# Patient Record
Sex: FEMALE | Race: WHITE | HISPANIC OR LATINO | Employment: OTHER | URBAN - NONMETROPOLITAN AREA
[De-identification: names, ages, dates, MRNs, and addresses within clinical notes are randomized per-mention and may not be internally consistent; named-entity substitution may affect disease eponyms.]

---

## 2023-03-28 DIAGNOSIS — E78.5 HYPERLIPIDEMIA, UNSPECIFIED HYPERLIPIDEMIA TYPE: Primary | ICD-10-CM

## 2023-03-28 RX ORDER — ROSUVASTATIN CALCIUM 10 MG/1
10 TABLET, COATED ORAL NIGHTLY
Qty: 90 TABLET | Refills: 1 | Status: SHIPPED | OUTPATIENT
Start: 2023-03-28 | End: 2023-05-02 | Stop reason: SDUPTHER

## 2023-03-28 RX ORDER — ROSUVASTATIN CALCIUM 10 MG/1
10 TABLET, COATED ORAL NIGHTLY
COMMUNITY
End: 2023-03-28 | Stop reason: SDUPTHER

## 2023-05-01 PROBLEM — K58.9 IRRITABLE BOWEL SYNDROME: Status: ACTIVE | Noted: 2023-05-01

## 2023-05-01 PROBLEM — Z79.890 NEED FOR PROPHYLACTIC HORMONE REPLACEMENT THERAPY (POSTMENOPAUSAL): Status: ACTIVE | Noted: 2023-05-01

## 2023-05-01 PROBLEM — M54.50 CHRONIC LOW BACK PAIN: Status: ACTIVE | Noted: 2023-05-01

## 2023-05-01 PROBLEM — E78.5 HYPERLIPIDEMIA: Status: ACTIVE | Noted: 2023-05-01

## 2023-05-01 PROBLEM — E66.9 OBESITY: Status: RESOLVED | Noted: 2023-05-01 | Resolved: 2023-05-01

## 2023-05-01 PROBLEM — I10 BENIGN ESSENTIAL HYPERTENSION: Status: ACTIVE | Noted: 2023-05-01

## 2023-05-01 PROBLEM — G89.29 CHRONIC LOW BACK PAIN: Status: ACTIVE | Noted: 2023-05-01

## 2023-05-01 PROBLEM — M47.9 DEGENERATIVE ARTHRITIS OF SPINE: Status: ACTIVE | Noted: 2023-05-01

## 2023-05-01 PROBLEM — K21.9 GERD (GASTROESOPHAGEAL REFLUX DISEASE): Status: ACTIVE | Noted: 2023-05-01

## 2023-05-01 PROBLEM — Z78.0 POSTMENOPAUSAL ESTROGEN DEFICIENCY: Status: ACTIVE | Noted: 2023-05-01

## 2023-05-01 PROBLEM — R73.01 ELEVATED FASTING GLUCOSE: Status: ACTIVE | Noted: 2023-05-01

## 2023-05-01 PROBLEM — Z00.00 MEDICARE ANNUAL WELLNESS VISIT, SUBSEQUENT: Status: ACTIVE | Noted: 2023-05-01

## 2023-05-01 PROBLEM — G43.909 MIGRAINE: Status: ACTIVE | Noted: 2023-05-01

## 2023-05-01 RX ORDER — MECLIZINE HYDROCHLORIDE 25 MG/1
1 TABLET ORAL 3 TIMES DAILY PRN
COMMUNITY
Start: 2019-12-10

## 2023-05-01 RX ORDER — HYOSCYAMINE SULFATE 0.125 MG
0.12 TABLET ORAL EVERY 4 HOURS PRN
COMMUNITY
Start: 2020-10-08 | End: 2023-05-02 | Stop reason: SDUPTHER

## 2023-05-01 RX ORDER — TRAMADOL HYDROCHLORIDE 50 MG/1
50 TABLET ORAL 3 TIMES DAILY
COMMUNITY
End: 2023-08-02 | Stop reason: SDUPTHER

## 2023-05-01 RX ORDER — CARVEDILOL 12.5 MG/1
12.5 TABLET ORAL 2 TIMES DAILY
COMMUNITY
End: 2023-05-02 | Stop reason: SDUPTHER

## 2023-05-01 RX ORDER — PSYLLIUM HUSK 0.4 G
CAPSULE ORAL
COMMUNITY
Start: 2017-06-30

## 2023-05-01 RX ORDER — ONDANSETRON HYDROCHLORIDE 8 MG/1
8 TABLET, FILM COATED ORAL EVERY 8 HOURS PRN
COMMUNITY
Start: 2020-01-08 | End: 2023-05-02 | Stop reason: SDUPTHER

## 2023-05-01 RX ORDER — HYOSCYAMINE SULFATE 0.38 MG/1
0.38 TABLET, EXTENDED RELEASE ORAL EVERY 12 HOURS PRN
COMMUNITY
Start: 2023-02-01 | End: 2023-05-02 | Stop reason: SDUPTHER

## 2023-05-01 RX ORDER — SUMATRIPTAN SUCCINATE 100 MG/1
100 TABLET ORAL AS NEEDED
COMMUNITY

## 2023-05-01 RX ORDER — PANTOPRAZOLE SODIUM 40 MG/1
40 TABLET, DELAYED RELEASE ORAL DAILY
COMMUNITY
End: 2023-11-02 | Stop reason: SDUPTHER

## 2023-05-01 NOTE — PROGRESS NOTES
Subjective   Patient ID: Jenn Mistry is a 67 y.o. female who presents for Follow-up, Hypertension, Hyperlipidemia, Pain, and PRE DIABETES.    HPI     CHRONIC PAIN  Degenerative arthritis/chronic pain that has been historically well controlled on Tramadol 50 mg BID  Rates pain as 6-8 out of 10 without meds, brings down to a 0-1 with the medication.  Doing well, no side effects reported other than one episode of nausea/dizziness that occurred when taking meds without eating.  Has not occurred again, now eating with dosing.  Taking 2 tablets a day, about 12 hours apart.  On rare occasion will need 3 times per day.     CSA last completed 7/2022  UDS last completed 7/2022    HYPERTENSION  Medications are being taken and tolerated well. No side effects are reported.  Patient is taking blood pressure outside of office and reports good control.  Patient denies chest pain, shortness of breath with exertion, palpitations, lower extremity edema, or dizziness.     HYPERLIPIDEMIA  Medication(s) are being taken as directed and tolerated well. No side effects reported.  Patient denies any facial droop, sudden vision loss, weakness or numbness on one side of body.   Patient denies chest pain, shortness of breath with exertion, palpitations, or symptoms of claudication.       Review of Systems   Respiratory:  Negative for shortness of breath.    Cardiovascular:  Negative for chest pain, palpitations and leg swelling.   Musculoskeletal:  Positive for back pain.   Neurological:  Negative for dizziness.   All other systems reviewed and are negative.      Objective   /84 (BP Location: Left arm, Patient Position: Sitting, BP Cuff Size: Large adult)   Pulse 63   Temp 36.5 °C (97.7 °F)   Resp 14   Wt 109 kg (240 lb 3.2 oz)   SpO2 95%   BMI 38.19 kg/m²     Physical Exam  Vitals and nursing note reviewed.   Constitutional:       General: She is not in acute distress.     Appearance: Normal appearance. She is not  toxic-appearing.   HENT:      Head: Normocephalic and atraumatic.   Cardiovascular:      Rate and Rhythm: Normal rate and regular rhythm.      Heart sounds: No murmur heard.     No friction rub. No gallop.   Pulmonary:      Effort: Pulmonary effort is normal.      Breath sounds: Normal breath sounds. No wheezing, rhonchi or rales.   Musculoskeletal:      Right lower leg: No edema.      Left lower leg: No edema.   Skin:     General: Skin is warm and dry.   Neurological:      General: No focal deficit present.      Mental Status: She is alert and oriented to person, place, and time.   Psychiatric:         Mood and Affect: Mood normal.         Behavior: Behavior normal.         Assessment/Plan   Problem List Items Addressed This Visit          Circulatory    Benign essential hypertension - Primary     BP under good control, 126/84 in office today.  Continue Carvedilol 12.5 mg BID.         Relevant Medications    carvedilol (Coreg) 12.5 mg tablet       Digestive    Irritable bowel syndrome     Hyoscyamine refilled today at patient request.         Relevant Medications    hyoscyamine (Anaspaz, Levsin) 0.125 mg tablet       Other    Chronic low back pain     Stable, well controlled on Tramadol 50 mg BID, this was refilled today.    Controlled Substance Visit -- I have personally reviewed the [place]AR[place] report for this patient. There is copy of report in electronic medical record. I have considered the risks of abuse, dependence, addiction, and diversion. I believe that it is clinically appropriate for this patient to be prescribed this medication.     CSA last completed 7/2022  UDS last completed 7/2022         Hyperlipidemia     Cholesterol historically well controlled on Rosuvastatin 10 mg daily, this was refilled today.  11/2022 TC/HDL ratio 2.6 (TRIG 133).  Goal TC/HDL ratio 3.4 or less, LDL 99 or less,  or less, and TRIG 150 or less.   Patient's cholesterol panel is favorable.         Relevant Medications     rosuvastatin (Crestor) 10 mg tablet    Migraine    Relevant Medications    carvedilol (Coreg) 12.5 mg tablet    ondansetron (Zofran) 8 mg tablet     Other Visit Diagnoses       Medication management              Follow-up in 3 months for recheck chronic pain + CS visit.  Will be due for CSA and UDS at that time.  Call for sooner follow-up if needed.     Time Spent  Prep time on day of patient encounter: 5 minutes  Time spent directly with patient, family or caregiver: 25 minutes  Additional Time Spent on Patient Care Activities: 0 minutes  Documentation Time: 6 minutes  Other Time Spent: 0 minutes  Total: 36 minutes      Scribe Attestation  By signing my name below, ILorena Scribe   attest that this documentation has been prepared under the direction and in the presence of Pam Galaviz DO.

## 2023-05-02 ENCOUNTER — OFFICE VISIT (OUTPATIENT)
Dept: PRIMARY CARE | Facility: CLINIC | Age: 68
End: 2023-05-02
Payer: MEDICARE

## 2023-05-02 VITALS
RESPIRATION RATE: 14 BRPM | DIASTOLIC BLOOD PRESSURE: 84 MMHG | OXYGEN SATURATION: 95 % | HEART RATE: 63 BPM | SYSTOLIC BLOOD PRESSURE: 126 MMHG | BODY MASS INDEX: 38.19 KG/M2 | TEMPERATURE: 97.7 F | WEIGHT: 240.2 LBS

## 2023-05-02 DIAGNOSIS — K58.9 IRRITABLE BOWEL SYNDROME, UNSPECIFIED TYPE: ICD-10-CM

## 2023-05-02 DIAGNOSIS — M54.50 CHRONIC LOW BACK PAIN, UNSPECIFIED BACK PAIN LATERALITY, UNSPECIFIED WHETHER SCIATICA PRESENT: ICD-10-CM

## 2023-05-02 DIAGNOSIS — E78.5 HYPERLIPIDEMIA, UNSPECIFIED HYPERLIPIDEMIA TYPE: ICD-10-CM

## 2023-05-02 DIAGNOSIS — G43.809 OTHER MIGRAINE WITHOUT STATUS MIGRAINOSUS, NOT INTRACTABLE: ICD-10-CM

## 2023-05-02 DIAGNOSIS — Z79.899 MEDICATION MANAGEMENT: ICD-10-CM

## 2023-05-02 DIAGNOSIS — G89.29 CHRONIC LOW BACK PAIN, UNSPECIFIED BACK PAIN LATERALITY, UNSPECIFIED WHETHER SCIATICA PRESENT: ICD-10-CM

## 2023-05-02 DIAGNOSIS — I10 BENIGN ESSENTIAL HYPERTENSION: Primary | ICD-10-CM

## 2023-05-02 PROCEDURE — 1036F TOBACCO NON-USER: CPT | Performed by: FAMILY MEDICINE

## 2023-05-02 PROCEDURE — 3079F DIAST BP 80-89 MM HG: CPT | Performed by: FAMILY MEDICINE

## 2023-05-02 PROCEDURE — 1160F RVW MEDS BY RX/DR IN RCRD: CPT | Performed by: FAMILY MEDICINE

## 2023-05-02 PROCEDURE — 99213 OFFICE O/P EST LOW 20 MIN: CPT | Performed by: FAMILY MEDICINE

## 2023-05-02 PROCEDURE — 3074F SYST BP LT 130 MM HG: CPT | Performed by: FAMILY MEDICINE

## 2023-05-02 PROCEDURE — 1159F MED LIST DOCD IN RCRD: CPT | Performed by: FAMILY MEDICINE

## 2023-05-02 RX ORDER — CARVEDILOL 12.5 MG/1
12.5 TABLET ORAL 2 TIMES DAILY
Qty: 90 TABLET | Refills: 3 | Status: SHIPPED | OUTPATIENT
Start: 2023-05-02 | End: 2023-11-02 | Stop reason: SDUPTHER

## 2023-05-02 RX ORDER — ONDANSETRON HYDROCHLORIDE 8 MG/1
8 TABLET, FILM COATED ORAL EVERY 8 HOURS PRN
Qty: 20 TABLET | Refills: 3 | Status: SHIPPED | OUTPATIENT
Start: 2023-05-02 | End: 2023-08-02 | Stop reason: SDUPTHER

## 2023-05-02 RX ORDER — CHOLECALCIFEROL (VITAMIN D3) 25 MCG
25 TABLET ORAL DAILY
COMMUNITY

## 2023-05-02 RX ORDER — ROSUVASTATIN CALCIUM 10 MG/1
10 TABLET, COATED ORAL NIGHTLY
Qty: 90 TABLET | Refills: 3 | Status: SHIPPED | OUTPATIENT
Start: 2023-05-02 | End: 2023-11-02 | Stop reason: SDUPTHER

## 2023-05-02 RX ORDER — CALCIUM CARBONATE 500(1250)
1 TABLET ORAL
COMMUNITY

## 2023-05-02 RX ORDER — HYOSCYAMINE SULFATE 0.125 MG
0.12 TABLET ORAL EVERY 4 HOURS PRN
Qty: 90 TABLET | Refills: 3 | Status: SHIPPED | OUTPATIENT
Start: 2023-05-02 | End: 2023-11-02 | Stop reason: SDUPTHER

## 2023-05-02 ASSESSMENT — ENCOUNTER SYMPTOMS
DIZZINESS: 0
BACK PAIN: 1
SHORTNESS OF BREATH: 0
PALPITATIONS: 0

## 2023-05-02 ASSESSMENT — PAIN SCALES - GENERAL: PAINLEVEL: 0-NO PAIN

## 2023-05-02 NOTE — ASSESSMENT & PLAN NOTE
Cholesterol historically well controlled on Rosuvastatin 10 mg daily, this was refilled today.  11/2022 TC/HDL ratio 2.6 (TRIG 133).  Goal TC/HDL ratio 3.4 or less, LDL 99 or less,  or less, and TRIG 150 or less.   Patient's cholesterol panel is favorable.

## 2023-05-02 NOTE — ASSESSMENT & PLAN NOTE
Stable, well controlled on Tramadol 50 mg BID or on the rare occasion TID.  Tramadol was refilled today.    Controlled Substance Visit -- I have personally reviewed the [place]ARRS report for this patient. There is copy of report in electronic medical record. I have considered the risks of abuse, dependence, addiction, and diversion. I believe that it is clinically appropriate for this patient to be prescribed this medication.     CSA last completed 7/2022  UDS last completed 7/2022

## 2023-08-02 ENCOUNTER — OFFICE VISIT (OUTPATIENT)
Dept: PRIMARY CARE | Facility: CLINIC | Age: 68
End: 2023-08-02
Payer: MEDICARE

## 2023-08-02 VITALS
TEMPERATURE: 96.8 F | HEIGHT: 67 IN | BODY MASS INDEX: 38.04 KG/M2 | OXYGEN SATURATION: 96 % | DIASTOLIC BLOOD PRESSURE: 82 MMHG | HEART RATE: 63 BPM | SYSTOLIC BLOOD PRESSURE: 134 MMHG | RESPIRATION RATE: 14 BRPM | WEIGHT: 242.4 LBS

## 2023-08-02 DIAGNOSIS — R11.0 NAUSEA: ICD-10-CM

## 2023-08-02 DIAGNOSIS — G89.29 CHRONIC LOW BACK PAIN, UNSPECIFIED BACK PAIN LATERALITY, UNSPECIFIED WHETHER SCIATICA PRESENT: Primary | ICD-10-CM

## 2023-08-02 DIAGNOSIS — Z79.899 MEDICATION MANAGEMENT: ICD-10-CM

## 2023-08-02 DIAGNOSIS — I10 BENIGN ESSENTIAL HYPERTENSION: ICD-10-CM

## 2023-08-02 DIAGNOSIS — E78.5 HYPERLIPIDEMIA, UNSPECIFIED HYPERLIPIDEMIA TYPE: ICD-10-CM

## 2023-08-02 DIAGNOSIS — M54.50 CHRONIC LOW BACK PAIN, UNSPECIFIED BACK PAIN LATERALITY, UNSPECIFIED WHETHER SCIATICA PRESENT: Primary | ICD-10-CM

## 2023-08-02 PROCEDURE — 1160F RVW MEDS BY RX/DR IN RCRD: CPT | Performed by: FAMILY MEDICINE

## 2023-08-02 PROCEDURE — 1159F MED LIST DOCD IN RCRD: CPT | Performed by: FAMILY MEDICINE

## 2023-08-02 PROCEDURE — 99213 OFFICE O/P EST LOW 20 MIN: CPT | Performed by: FAMILY MEDICINE

## 2023-08-02 PROCEDURE — 1126F AMNT PAIN NOTED NONE PRSNT: CPT | Performed by: FAMILY MEDICINE

## 2023-08-02 PROCEDURE — 1036F TOBACCO NON-USER: CPT | Performed by: FAMILY MEDICINE

## 2023-08-02 PROCEDURE — 3075F SYST BP GE 130 - 139MM HG: CPT | Performed by: FAMILY MEDICINE

## 2023-08-02 PROCEDURE — 3079F DIAST BP 80-89 MM HG: CPT | Performed by: FAMILY MEDICINE

## 2023-08-02 RX ORDER — ONDANSETRON HYDROCHLORIDE 8 MG/1
8 TABLET, FILM COATED ORAL EVERY 8 HOURS PRN
Qty: 30 TABLET | Refills: 3 | Status: SHIPPED | OUTPATIENT
Start: 2023-08-02 | End: 2024-03-07 | Stop reason: ALTCHOICE

## 2023-08-02 RX ORDER — TRAMADOL HYDROCHLORIDE 50 MG/1
50 TABLET ORAL 3 TIMES DAILY
Qty: 270 TABLET | Refills: 0 | Status: SHIPPED | OUTPATIENT
Start: 2023-08-02 | End: 2023-11-02 | Stop reason: SDUPTHER

## 2023-08-02 NOTE — PROGRESS NOTES
"Subjective   Patient ID: Jenn Mistry is a 67 y.o. female who presents for Follow-up (Pt is here for follow up CSA/UDS- pt has no concerns at this time.).    HPI     CHRONIC PAIN  Degenerative arthritis/chronic pain that has been historically well controlled on Tramadol 50 mg BID  Rates pain as 6-8 out of 10 without meds, brings down to a 0-1 with the medication.  Doing well, no side effects reported other than one episode of nausea/dizziness that occurred when taking meds without eating.  Has not occurred again, now eating with dosing.  Taking 2 tablets a day, about 12 hours apart.  On rare occasion will need 3 times per day.  States cannot function without the Tramadol.     CSA last completed 7/2022  UDS last completed 7/2022    HTN  Taking Carvedilol 12.5 mg BID, also on this for headaches.     Medications are being taken and tolerated well. No side effects are reported.  Patient denies chest pain, shortness of breath with exertion, palpitations, lower extremity edema, or dizziness.     HLD  Medication(s) are being taken as directed and tolerated well. No side effects reported.  Patient denies any facial droop, sudden vision loss, weakness or numbness on one side of body.   Patient denies chest pain, shortness of breath with exertion, palpitations, or symptoms of claudication.     Review of Systems   All other systems reviewed and are negative.      Objective   /82 (BP Location: Right arm, Patient Position: Sitting, BP Cuff Size: Large adult)   Pulse 63   Temp 36 °C (96.8 °F) (Temporal)   Resp 14   Ht 1.702 m (5' 7\")   Wt 110 kg (242 lb 6.4 oz)   SpO2 96%   BMI 37.97 kg/m²     Physical Exam  Vitals and nursing note reviewed.   Constitutional:       General: She is not in acute distress.     Appearance: Normal appearance. She is not toxic-appearing.   HENT:      Head: Normocephalic and atraumatic.   Cardiovascular:      Rate and Rhythm: Normal rate and regular rhythm.      Heart sounds: No murmur " heard.     No friction rub. No gallop.   Pulmonary:      Effort: Pulmonary effort is normal.      Breath sounds: Normal breath sounds. No wheezing, rhonchi or rales.   Musculoskeletal:      Right lower leg: No edema.      Left lower leg: No edema.   Skin:     General: Skin is warm and dry.   Neurological:      General: No focal deficit present.      Mental Status: She is alert and oriented to person, place, and time.   Psychiatric:         Mood and Affect: Mood normal.         Behavior: Behavior normal.         Assessment/Plan   Problem List Items Addressed This Visit       Benign essential hypertension     BP under good control, 134/82 in office today.  Continue Carvedilol 12.5 mg BID.         Chronic low back pain - Primary     Stable, well controlled on Tramadol 50 mg BID or on the rare occasion TID.  Tramadol was refilled today.    Controlled Substance Visit -- I have personally reviewed the OARRS report for this patient. There is copy of report in electronic medical record. I have considered the risks of abuse, dependence, addiction, and diversion. I believe that it is clinically appropriate for this patient to be prescribed this medication.     CSA last completed 7/2022, updated today.  UDS last completed 7/2022, ordered today.         Hyperlipidemia     Cholesterol historically well controlled on Rosuvastatin 10 mg daily, this was refilled today.  11/2022 TC/HDL ratio 2.6 (TRIG 133).  Goal TC/HDL ratio 3.4 or less, LDL 99 or less,  or less, and TRIG 150 or less.   Patient's cholesterol panel is favorable.          Other Visit Diagnoses       Medication management        Nausea              Follow-up in 3 months for recheck chronic pain + CS visit.  Call for sooner follow-up if needed    Time Spent  Prep time on day of patient encounter: 5 minutes  Time spent directly with patient, family or caregiver: 20 minutes  Additional Time Spent on Patient Care Activities: 0 minutes  Documentation Time: 5  minutes  Other Time Spent: 0 minutes  Total: 30 minutes      Scribe Attestation  By signing my name below, I, Erin Becerra   attest that this documentation has been prepared under the direction and in the presence of Pam Galaviz DO.

## 2023-08-02 NOTE — ASSESSMENT & PLAN NOTE
Stable, well controlled on Tramadol 50 mg BID or on the rare occasion TID.  Tramadol was refilled today.    Controlled Substance Visit -- I have personally reviewed the OARRS report for this patient. There is copy of report in electronic medical record. I have considered the risks of abuse, dependence, addiction, and diversion. I believe that it is clinically appropriate for this patient to be prescribed this medication.     CSA last completed 7/2022, updated today.  UDS last completed 7/2022, ordered today.

## 2023-10-31 ENCOUNTER — LAB (OUTPATIENT)
Dept: LAB | Facility: LAB | Age: 68
End: 2023-10-31
Payer: MEDICARE

## 2023-10-31 DIAGNOSIS — M54.50 CHRONIC LOW BACK PAIN, UNSPECIFIED BACK PAIN LATERALITY, UNSPECIFIED WHETHER SCIATICA PRESENT: ICD-10-CM

## 2023-10-31 DIAGNOSIS — G89.29 CHRONIC LOW BACK PAIN, UNSPECIFIED BACK PAIN LATERALITY, UNSPECIFIED WHETHER SCIATICA PRESENT: ICD-10-CM

## 2023-10-31 DIAGNOSIS — Z79.899 MEDICATION MANAGEMENT: ICD-10-CM

## 2023-10-31 LAB
AMPHETAMINES UR QL SCN: NORMAL
BARBITURATES UR QL SCN: NORMAL
BZE UR QL SCN: NORMAL
CANNABINOIDS UR QL SCN: NORMAL
CREAT UR-MCNC: 32.3 MG/DL (ref 20–320)
PCP UR QL SCN: NORMAL

## 2023-10-31 PROCEDURE — 80368 SEDATIVE HYPNOTICS: CPT

## 2023-10-31 PROCEDURE — 80358 DRUG SCREENING METHADONE: CPT

## 2023-10-31 PROCEDURE — 80365 DRUG SCREENING OXYCODONE: CPT

## 2023-10-31 PROCEDURE — 80307 DRUG TEST PRSMV CHEM ANLYZR: CPT

## 2023-10-31 PROCEDURE — 80361 OPIATES 1 OR MORE: CPT

## 2023-10-31 PROCEDURE — 80354 DRUG SCREENING FENTANYL: CPT

## 2023-10-31 PROCEDURE — 80373 DRUG SCREENING TRAMADOL: CPT

## 2023-10-31 PROCEDURE — 80346 BENZODIAZEPINES1-12: CPT

## 2023-10-31 PROCEDURE — 82570 ASSAY OF URINE CREATININE: CPT

## 2023-11-02 ENCOUNTER — OFFICE VISIT (OUTPATIENT)
Dept: PRIMARY CARE | Facility: CLINIC | Age: 68
End: 2023-11-02
Payer: MEDICARE

## 2023-11-02 VITALS
DIASTOLIC BLOOD PRESSURE: 79 MMHG | BODY MASS INDEX: 38.89 KG/M2 | RESPIRATION RATE: 14 BRPM | OXYGEN SATURATION: 97 % | HEART RATE: 63 BPM | WEIGHT: 242 LBS | HEIGHT: 66 IN | SYSTOLIC BLOOD PRESSURE: 122 MMHG | TEMPERATURE: 97.9 F

## 2023-11-02 DIAGNOSIS — Z12.31 OTHER SCREENING MAMMOGRAM: ICD-10-CM

## 2023-11-02 DIAGNOSIS — R60.0 LOWER EXTREMITY EDEMA: ICD-10-CM

## 2023-11-02 DIAGNOSIS — Z13.6 SCREENING FOR CARDIOVASCULAR CONDITION: ICD-10-CM

## 2023-11-02 DIAGNOSIS — K21.9 GASTROESOPHAGEAL REFLUX DISEASE WITHOUT ESOPHAGITIS: ICD-10-CM

## 2023-11-02 DIAGNOSIS — K58.9 IRRITABLE BOWEL SYNDROME, UNSPECIFIED TYPE: ICD-10-CM

## 2023-11-02 DIAGNOSIS — Z23 IMMUNIZATION DUE: ICD-10-CM

## 2023-11-02 DIAGNOSIS — I10 BENIGN ESSENTIAL HYPERTENSION: ICD-10-CM

## 2023-11-02 DIAGNOSIS — E66.9 CLASS 2 OBESITY WITH BODY MASS INDEX (BMI) OF 39.0 TO 39.9 IN ADULT, UNSPECIFIED OBESITY TYPE, UNSPECIFIED WHETHER SERIOUS COMORBIDITY PRESENT: ICD-10-CM

## 2023-11-02 DIAGNOSIS — E78.5 HYPERLIPIDEMIA, UNSPECIFIED HYPERLIPIDEMIA TYPE: ICD-10-CM

## 2023-11-02 DIAGNOSIS — Z13.89 ENCOUNTER FOR SCREENING FOR OTHER DISORDER: ICD-10-CM

## 2023-11-02 DIAGNOSIS — G43.809 OTHER MIGRAINE WITHOUT STATUS MIGRAINOSUS, NOT INTRACTABLE: ICD-10-CM

## 2023-11-02 DIAGNOSIS — G89.29 CHRONIC LOW BACK PAIN, UNSPECIFIED BACK PAIN LATERALITY, UNSPECIFIED WHETHER SCIATICA PRESENT: ICD-10-CM

## 2023-11-02 DIAGNOSIS — Z00.00 MEDICARE ANNUAL WELLNESS VISIT, SUBSEQUENT: Primary | ICD-10-CM

## 2023-11-02 DIAGNOSIS — R73.9 HYPERGLYCEMIA: ICD-10-CM

## 2023-11-02 DIAGNOSIS — M54.50 CHRONIC LOW BACK PAIN, UNSPECIFIED BACK PAIN LATERALITY, UNSPECIFIED WHETHER SCIATICA PRESENT: ICD-10-CM

## 2023-11-02 DIAGNOSIS — G43.909 MIGRAINE WITHOUT STATUS MIGRAINOSUS, NOT INTRACTABLE, UNSPECIFIED MIGRAINE TYPE: ICD-10-CM

## 2023-11-02 PROCEDURE — 1170F FXNL STATUS ASSESSED: CPT | Performed by: FAMILY MEDICINE

## 2023-11-02 PROCEDURE — G0008 ADMIN INFLUENZA VIRUS VAC: HCPCS | Performed by: FAMILY MEDICINE

## 2023-11-02 PROCEDURE — 3074F SYST BP LT 130 MM HG: CPT | Performed by: FAMILY MEDICINE

## 2023-11-02 PROCEDURE — 3078F DIAST BP <80 MM HG: CPT | Performed by: FAMILY MEDICINE

## 2023-11-02 PROCEDURE — G0439 PPPS, SUBSEQ VISIT: HCPCS | Performed by: FAMILY MEDICINE

## 2023-11-02 PROCEDURE — 1159F MED LIST DOCD IN RCRD: CPT | Performed by: FAMILY MEDICINE

## 2023-11-02 PROCEDURE — 3008F BODY MASS INDEX DOCD: CPT | Performed by: FAMILY MEDICINE

## 2023-11-02 PROCEDURE — 1160F RVW MEDS BY RX/DR IN RCRD: CPT | Performed by: FAMILY MEDICINE

## 2023-11-02 PROCEDURE — G0444 DEPRESSION SCREEN ANNUAL: HCPCS | Performed by: FAMILY MEDICINE

## 2023-11-02 PROCEDURE — 90662 IIV NO PRSV INCREASED AG IM: CPT | Performed by: FAMILY MEDICINE

## 2023-11-02 PROCEDURE — 1036F TOBACCO NON-USER: CPT | Performed by: FAMILY MEDICINE

## 2023-11-02 PROCEDURE — 1126F AMNT PAIN NOTED NONE PRSNT: CPT | Performed by: FAMILY MEDICINE

## 2023-11-02 RX ORDER — HYOSCYAMINE SULFATE 0.125 MG
0.12 TABLET ORAL EVERY 4 HOURS PRN
Qty: 90 TABLET | Refills: 3 | Status: SHIPPED | OUTPATIENT
Start: 2023-11-02 | End: 2024-04-04 | Stop reason: SDUPTHER

## 2023-11-02 RX ORDER — TRAMADOL HYDROCHLORIDE 50 MG/1
50 TABLET ORAL 3 TIMES DAILY
Qty: 270 TABLET | Refills: 0 | Status: SHIPPED | OUTPATIENT
Start: 2023-11-02 | End: 2024-02-16 | Stop reason: SDUPTHER

## 2023-11-02 RX ORDER — PANTOPRAZOLE SODIUM 40 MG/1
40 TABLET, DELAYED RELEASE ORAL DAILY
Qty: 90 TABLET | Refills: 3 | Status: SHIPPED | OUTPATIENT
Start: 2023-11-02

## 2023-11-02 RX ORDER — CARVEDILOL 12.5 MG/1
12.5 TABLET ORAL 2 TIMES DAILY
Qty: 90 TABLET | Refills: 3 | Status: SHIPPED | OUTPATIENT
Start: 2023-11-02 | End: 2024-05-24 | Stop reason: SDUPTHER

## 2023-11-02 RX ORDER — ROSUVASTATIN CALCIUM 10 MG/1
10 TABLET, COATED ORAL NIGHTLY
Qty: 90 TABLET | Refills: 3 | Status: SHIPPED | OUTPATIENT
Start: 2023-11-02

## 2023-11-02 ASSESSMENT — PATIENT HEALTH QUESTIONNAIRE - PHQ9
1. LITTLE INTEREST OR PLEASURE IN DOING THINGS: NOT AT ALL
SUM OF ALL RESPONSES TO PHQ9 QUESTIONS 1 AND 2: 0
2. FEELING DOWN, DEPRESSED OR HOPELESS: NOT AT ALL

## 2023-11-02 ASSESSMENT — ACTIVITIES OF DAILY LIVING (ADL)
MANAGING_FINANCES: INDEPENDENT
GROCERY_SHOPPING: INDEPENDENT
BATHING: INDEPENDENT
DRESSING: INDEPENDENT
TAKING_MEDICATION: INDEPENDENT
DOING_HOUSEWORK: INDEPENDENT

## 2023-11-02 ASSESSMENT — LIFESTYLE VARIABLES: HOW OFTEN DO YOU HAVE A DRINK CONTAINING ALCOHOL: MONTHLY OR LESS

## 2023-11-02 NOTE — PROGRESS NOTES
Subjective   Reason for Visit: Jenn Mistry is an 68 y.o. female here for a Medicare Wellness visit + routine OV + CS visit.    Past Medical, Surgical, and Family History reviewed and updated in chart.    Reviewed all medications by prescribing practitioner or clinical pharmacist (such as prescriptions, OTCs, herbal therapies and supplements) and documented in the medical record.    HPI    Patient presents today for routine FUV + CS visit + MWV  Patient is doing well overall, they have no new concerns or issues.     MEDICARE WELLNESS VISIT   TDAP: 4/2019  SHINGRIX: completed  PNEUMOVAX: completed  PAP: 11/2019 HPV negative - graduated  MAMMO: 11/2022 (mother with hx of breast cancer)  CSCOPE: 11/2022 (TA x 1, diverticulosis, hemorrhoids)  DEXA: 11/2021 (normal) (due 11/2031)   HEP C SCREEN: 6/2018 negative  CACS: none found    Patient is agreeable to influenza vaccine.    Diet: well-balanced  Exercise: regular  Alcohol use: socially  Smoking: non-smoker    Cervical cancer screening: GRADUATED    Breast cancer screening: DUE  Denies family history in first degree relative.  Denies lumps/bumps, skin changes, nipple retraction, or nipple drainage.    Bone density screening: UTD  Denies family history in first degree relative.  Patient is/is not supplementing calcium or vitamin D.    Colon cancer screening: UTD  Denies family history in first degree relative.  Denies melena, hematochezia, constipation, diarrhea, bloating, change in bowel habits.    ROUTINE VISIT  CHRONIC CONDITIONS:     -HTN  Taking Carvedilol 12.5 mg BID, also on this for headaches.    Reports some edema today, was working yesterday and wore compression stockings but did not get to elevate these.    Medications are being taken and tolerated well.   No side effects are reported.  Patient is taking blood pressure outside of office and reports good control.  Patient denies chest pain, shortness of breath with exertion, palpitations, headache, or  "dizziness.     -HLD  Cholesterol historically well controlled on Rosuvastatin 10 mg daily.   Also takes aspirin 81 mg daily.  No CACS in our records.  11/2022 TC/HDL ratio 2.6 (TRIG 133).    Medication(s) are being taken as directed and tolerated well.   No side effects reported.  Patient denies any facial droop, sudden vision loss, weakness or numbness on one side of body.   Patient denies chest pain, shortness of breath with exertion, palpitations, or symptoms of claudication.     -Hyperglycemia  Lifestyle managed, mild elevation in A1c in past with normal fasting glucose.    11/2022 fasting glucose 88, hgb A1c 5.6  10/2021 fasting glucose 89, hgb A1c 5.8    -Migraines  Taking Carvedilol 12.5 mg BID for preventative measures.  Has sumatriptan for abortive measures.    -IBS  Hyoscyamine PRN    -Chronic low back pain  Degenerative arthritis/chronic pain that has been historically well controlled on Tramadol 50 mg BID  Rates pain as 6-8 out of 10 without meds, brings down to a 0-1 with the medication.  Taking 2 tablets a day, about 12 hours apart but does need to use 3 times per day on occasion when active.  Tolerating well, no side effects reported.  Needs medication to be functional with day to day tasks.    OAR[place] reviewed 8/2023  CSA last completed 8/2023  UDS last completed 10/2023    Patient Care Team:  Pam Galaviz DO as PCP - General  Pam Galaviz DO as PCP - [facility] Attributed Provider     Review of Systems   All other systems reviewed and are negative.    Objective   Vitals:  /79 (BP Location: Left arm, Patient Position: Sitting, BP Cuff Size: Large adult)   Pulse 63   Temp 36.6 °C (97.9 °F) (Temporal)   Resp 14   Ht 1.67 m (5' 5.75\")   Wt 110 kg (242 lb)   SpO2 97%   BMI 39.36 kg/m²       Physical Exam  Vitals and nursing note reviewed.   Constitutional:       General: She is not in acute distress.     Appearance: Normal appearance. She is not toxic-appearing.   HENT:      " Head: Normocephalic and atraumatic.   Cardiovascular:      Rate and Rhythm: Normal rate and regular rhythm.      Heart sounds: No murmur heard.     No friction rub. No gallop.   Pulmonary:      Effort: Pulmonary effort is normal.      Breath sounds: Normal breath sounds. No wheezing, rhonchi or rales.   Abdominal:      Palpations: Abdomen is soft. There is no mass.      Tenderness: There is no abdominal tenderness. There is no guarding or rebound.   Musculoskeletal:      Right lower leg: Edema (trace) present.      Left lower leg: Edema (trace) present.   Neurological:      General: No focal deficit present.      Mental Status: She is alert and oriented to person, place, and time.   Psychiatric:         Mood and Affect: Mood normal.         Behavior: Behavior normal.         Assessment/Plan   Problem List Items Addressed This Visit       Benign essential hypertension    Overview     Taking Carvedilol 12.5 mg BID, also on this for headaches.         Current Assessment & Plan     BP under good control, 134/82 in office today.  Continue Carvedilol 12.5 mg BID.         Relevant Orders    CBC    Comprehensive Metabolic Panel    Lipid Panel    Chronic low back pain    Overview     Degenerative arthritis/chronic pain that has been historically well controlled on Tramadol 50 mg BID  Rates pain as 6-8 out of 10 without meds, brings down to a 0-1 with the medication.  Taking 2 tablets a day, about 12 hours apart but does need to use 3 times per day on occasion when active.  Tolerating well, no side effects reported.  Needs medication to be functional with day to day tasks.    OARRS reviewed 8/2023  CSA last completed 8/2023  UDS last completed 10/2023         Current Assessment & Plan     Stable, well controlled on Tramadol 50 mg BID or on the rare occasion TID, continue as prescribed.    Controlled Substance Visit -- I have personally reviewed the OARRS report for this patient. There is copy of report in electronic medical  record. I have considered the risks of abuse, dependence, addiction, and diversion. I believe that it is clinically appropriate for this patient to be prescribed this medication.     CSA last completed 8/2023  UDS last completed 10/2023    Continue with controlled substance visits every 3 months.         Hyperglycemia    Overview     Lifestyle managed, mild elevation in A1c in past with normal fasting glucose.    11/2022 fasting glucose 88, hgb A1c 5.6  10/2021 fasting glucose 89, hgb A1c 5.8         Current Assessment & Plan     11/2022 fasting glucose 88, hgb A1c 5.6  10/2021 fasting glucose 89, hgb A1c 5.8    Most recent A1c in normal range  Diet and exercise recommendations revisited.  Repeat A1c ordered with routine labs today.         Relevant Orders    Comprehensive Metabolic Panel    Hemoglobin A1C    Hyperlipidemia    Overview     Cholesterol historically well controlled on Rosuvastatin 10 mg daily.   Also takes aspirin 81 mg daily.  No CACS in our records, ordered 11/2023.  11/2022 TC/HDL ratio 2.6 (TRIG 133).         Current Assessment & Plan     11/2022 TC/HDL ratio 2.6 (TRIG 133).  Goal TC/HDL ratio 3.4 or less, LDL 99 or less,  or less, and TRIG 150 or less.   Patient's cholesterol panel is favorable, continue Rosuvastatin 10 mg daily.    We discussed ordering a coronary artery calcium score to better determine how to treat your elevated cholesterol. This is a CT scan to determine if you have calcified plaque in your coronary arteries. Coronary artery calcium scores are used to help us determine how to best treat your elevated cholesterol.  The risks of this screen is we may find things that we are not looking for that we will have to follow up on such as lung nodules which are very common in [facility] fatty liver which is common in individuals that are overweight. There is also risk of radiation exposure. Can review at routine 3 month follow-up.         Relevant Orders    Lipid Panel     Migraine    Overview     Taking Carvedilol 12.5 mg BID for preventative measures.  Has sumatriptan for abortive measures.         Current Assessment & Plan     Stable, continue Carvedilol 12.5 mg BID for preventative measures.  Continue sumatriptan for abortive measures.         Medicare annual wellness visit, subsequent - Primary    Overview     TDAP: 4/2019  SHINGRIX: completed  PNEUMOVAX: completed  PAP: 11/2019 HPV negative - graduated  MAMMO: 11/2022 (mother with hx of breast cancer)  CSCOPE: 11/2022 (TA x 1, diverticulosis, hemorrhoids)  DEXA: 11/2021 (normal) (due 11/2031)   HEP C SCREEN: 6/2018 negative  CACS: none found         Current Assessment & Plan     Vaccines and screenings reviewed.  Questionnaires completed.  Health and wellness topics reviewed.  Diet and exercise recommendations revisited.  Routine blood work ordered today.     VACCINES:  -Flu vaccine administered today.  -TDAP is up to date until 2029  -Shingrix vaccines previously completed, good for lifetime.  -Pneumonia vaccines previously completed, good for lifetime.    SCREENINGS:  -Screening mammogram due 11/2023, ordered today  -Screening colonoscopy UTD, completed 11/2022  -Screening DEXA is UTD, normal in 2021, repeat in 2031.    LIFESTYLE MEASURES  -make sure you are avoiding refined carbs such as breads, pasta, cereal, candy, soda,  nutrition bars, granola, chips, and sugar sweetened beverages.      -eat 5- 7 servings daily of veggies,  healthy protein such as chicken, fish,  beans, and eggs, and include healthy fats in your diet such as seeds, nuts, olive oil, avocados, and salmon.   -exercise 4 - 6 days per week as you are able, 150 minutes total weekly divided up is recommended.  -Vitamin D is recommended at 1000 - 5000 IU international units daily.   -Always wear sunscreen when you have sun exposure.  -64 oz of water is recommended daily.  -Dental visits recommended every 6 months.  -Eye exam recommended every 2 years, for those  with vision problems every year.           Lower extremity edema    Current Assessment & Plan     Trace on exam today, continue with compression stockings and elevating legs.          Other Visit Diagnoses       Encounter for screening for other disorder        Class 2 obesity with body mass index (BMI) of 39.0 to 39.9 in adult, unspecified obesity type, unspecified whether serious comorbidity present        Immunization due        Relevant Orders    Flu vaccine, quadrivalent, high-dose, preservative free, age 65y+ (FLUZONE) (Completed)    Other screening mammogram        Relevant Orders    BI mammo bilateral screening tomosynthesis    Screening for cardiovascular condition        Relevant Orders    CT cardiac scoring wo IV contrast            Follow-up in 3 months for routine care + CS visit.  Labs ordered today, to be done fasting at patient's convenience.  Call for sooner follow-up if needed.     Scribe Attestation  By signing my name below, ILorena Scribe   attest that this documentation has been prepared under the direction and in the presence of Pam Galaviz DO.

## 2023-11-02 NOTE — ASSESSMENT & PLAN NOTE
11/2022 TC/HDL ratio 2.6 (TRIG 133).  Goal TC/HDL ratio 3.4 or less, LDL 99 or less,  or less, and TRIG 150 or less.   Patient's cholesterol panel is favorable, continue Rosuvastatin 10 mg daily.    We discussed ordering a coronary artery calcium score to better determine how to treat your elevated cholesterol. This is a CT scan to determine if you have calcified plaque in your coronary arteries. Coronary artery calcium scores are used to help us determine how to best treat your elevated cholesterol.  The risks of this screen is we may find things that we are not looking for that we will have to follow up on such as lung nodules which are very common in [facility] fatty liver which is common in individuals that are overweight. There is also risk of radiation exposure. Can review at routine 3 month follow-up.

## 2023-11-02 NOTE — ASSESSMENT & PLAN NOTE
Vaccines and screenings reviewed.  Questionnaires completed.  Health and wellness topics reviewed.  Diet and exercise recommendations revisited.  Routine blood work ordered today.     VACCINES:  -Flu vaccine administered today.  -TDAP is up to date until 2029  -Shingrix vaccines previously completed, good for lifetime.  -Pneumonia vaccines previously completed, good for lifetime.    SCREENINGS:  -Screening mammogram due 11/2023, ordered today  -Screening colonoscopy UTD, completed 11/2022  -Screening DEXA is UTD, normal in 2021, repeat in 2031.    LIFESTYLE MEASURES  -make sure you are avoiding refined carbs such as breads, pasta, cereal, candy, soda,  nutrition bars, granola, chips, and sugar sweetened beverages.      -eat 5- 7 servings daily of veggies,  healthy protein such as chicken, fish,  beans, and eggs, and include healthy fats in your diet such as seeds, nuts, olive oil, avocados, and salmon.   -exercise 4 - 6 days per week as you are able, 150 minutes total weekly divided up is recommended.  -Vitamin D is recommended at 1000 - 5000 IU international units daily.   -Always wear sunscreen when you have sun exposure.  -64 oz of water is recommended daily.  -Dental visits recommended every 6 months.  -Eye exam recommended every 2 years, for those with vision problems every year.

## 2023-11-02 NOTE — ASSESSMENT & PLAN NOTE
11/2022 fasting glucose 88, hgb A1c 5.6  10/2021 fasting glucose 89, hgb A1c 5.8    Most recent A1c in normal range  Diet and exercise recommendations revisited.  Repeat A1c ordered with routine labs today.

## 2023-11-02 NOTE — ASSESSMENT & PLAN NOTE
Stable, continue Carvedilol 12.5 mg BID for preventative measures.  Continue sumatriptan for abortive measures.

## 2023-11-02 NOTE — ASSESSMENT & PLAN NOTE
Stable, well controlled on Tramadol 50 mg BID or on the rare occasion TID, continue as prescribed.    Controlled Substance Visit -- I have personally reviewed the [place]AR[place] report for this patient. There is copy of report in electronic medical record. I have considered the risks of abuse, dependence, addiction, and diversion. I believe that it is clinically appropriate for this patient to be prescribed this medication.     CSA last completed 8/2023  UDS last completed 10/2023    Continue with controlled substance visits every 3 months.

## 2023-11-02 NOTE — PATIENT INSTRUCTIONS
Medicare annual wellness visit, subsequent  Vaccines and screenings reviewed.  Questionnaires completed.  Health and wellness topics reviewed.  Diet and exercise recommendations revisited.  Routine blood work ordered today.     VACCINES:  -Flu vaccine administered today.  -TDAP is up to date until 2029  -Shingrix vaccines previously completed, good for lifetime.  -Pneumonia vaccines previously completed, good for lifetime.    SCREENINGS:  -Screening mammogram due 11/2023, ordered today  -Screening colonoscopy UTD, completed 11/2022  -Screening DEXA is UTD, normal in 2021, repeat in 2031.    LIFESTYLE MEASURES  -make sure you are avoiding refined carbs such as breads, pasta, cereal, candy, soda,  nutrition bars, granola, chips, and sugar sweetened beverages.      -eat 5- 7 servings daily of veggies,  healthy protein such as chicken, fish,  beans, and eggs, and include healthy fats in your diet such as seeds, nuts, olive oil, avocados, and salmon.   -exercise 4 - 6 days per week as you are able, 150 minutes total weekly divided up is recommended.  -Vitamin D is recommended at 1000 - 5000 IU international units daily.   -Always wear sunscreen when you have sun exposure.  -64 oz of water is recommended daily.  -Dental visits recommended every 6 months.  -Eye exam recommended every 2 years, for those with vision problems every year.      Hyperlipidemia  11/2022 TC/HDL ratio 2.6 (TRIG 133).  Goal TC/HDL ratio 3.4 or less, LDL 99 or less,  or less, and TRIG 150 or less.   Patient's cholesterol panel is favorable, continue Rosuvastatin 10 mg daily.    We discussed ordering a coronary artery calcium score to better determine how to treat your elevated cholesterol. This is a CT scan to determine if you have calcified plaque in your coronary arteries. Coronary artery calcium scores are used to help us determine how to best treat your elevated cholesterol.  The risks of this screen is we may find things that we are not  looking for that we will have to follow up on such as lung nodules which are very common in [facility] fatty liver which is common in individuals that are overweight. There is also risk of radiation exposure. Can review at routine 3 month follow-up.    Benign essential hypertension  BP under good control, 134/82 in office today.  Continue Carvedilol 12.5 mg BID.    Hyperglycemia  11/2022 fasting glucose 88, hgb A1c 5.6  10/2021 fasting glucose 89, hgb A1c 5.8    Most recent A1c in normal range  Diet and exercise recommendations revisited.  Repeat A1c ordered with routine labs today.    Migraine  Stable, continue Carvedilol 12.5 mg BID for preventative measures.  Continue sumatriptan for abortive measures.    Chronic low back pain  Stable, well controlled on Tramadol 50 mg BID or on the rare occasion TID, continue as prescribed.    Controlled Substance Visit -- I have personally reviewed the OARRS report for this patient. There is copy of report in electronic medical record. I have considered the risks of abuse, dependence, addiction, and diversion. I believe that it is clinically appropriate for this patient to be prescribed this medication.     CSA last completed 8/2023  UDS last completed 10/2023    Continue with controlled substance visits every 3 months.    Lower extremity edema  Trace on exam today, continue with compression stockings and elevating legs.

## 2023-11-03 LAB
1OH-MIDAZOLAM UR CFM-MCNC: <25 NG/ML
6MAM UR CFM-MCNC: <25 NG/ML
7AMINOCLONAZEPAM UR CFM-MCNC: <25 NG/ML
A-OH ALPRAZ UR CFM-MCNC: <25 NG/ML
ALPRAZ UR CFM-MCNC: <25 NG/ML
CHLORDIAZEP UR CFM-MCNC: <25 NG/ML
CLONAZEPAM UR CFM-MCNC: <25 NG/ML
CODEINE UR CFM-MCNC: <50 NG/ML
DIAZEPAM UR CFM-MCNC: <25 NG/ML
EDDP UR CFM-MCNC: <25 NG/ML
FENTANYL UR CFM-MCNC: <2.5 NG/ML
HYDROCODONE CTO UR CFM-MCNC: <25 NG/ML
HYDROMORPHONE UR CFM-MCNC: <25 NG/ML
LORAZEPAM UR CFM-MCNC: <25 NG/ML
METHADONE UR CFM-MCNC: <25 NG/ML
MIDAZOLAM UR CFM-MCNC: <25 NG/ML
MORPHINE UR CFM-MCNC: <50 NG/ML
NORDIAZEPAM UR CFM-MCNC: <25 NG/ML
NORFENTANYL UR CFM-MCNC: <2.5 NG/ML
NORHYDROCODONE UR CFM-MCNC: <25 NG/ML
NOROXYCODONE UR CFM-MCNC: <25 NG/ML
NORTRAMADOL UR-MCNC: >1000 NG/ML
NORTRAMADOL UR-MCNC: >1000 NG/ML
OXAZEPAM UR CFM-MCNC: <25 NG/ML
OXYCODONE UR CFM-MCNC: <25 NG/ML
OXYMORPHONE UR CFM-MCNC: <25 NG/ML
TEMAZEPAM UR CFM-MCNC: <25 NG/ML
TRAMADOL UR CFM-MCNC: >1000 NG/ML
TRAMADOL UR CFM-MCNC: >1000 NG/ML
ZOLPIDEM UR CFM-MCNC: <25 NG/ML
ZOLPIDEM UR-MCNC: <25 NG/ML

## 2023-11-30 ENCOUNTER — ANCILLARY PROCEDURE (OUTPATIENT)
Dept: RADIOLOGY | Facility: CLINIC | Age: 68
End: 2023-11-30
Payer: MEDICARE

## 2023-11-30 DIAGNOSIS — Z12.31 OTHER SCREENING MAMMOGRAM: ICD-10-CM

## 2023-11-30 PROCEDURE — 77067 SCR MAMMO BI INCL CAD: CPT

## 2023-11-30 PROCEDURE — 77067 SCR MAMMO BI INCL CAD: CPT | Performed by: RADIOLOGY

## 2023-11-30 PROCEDURE — 77063 BREAST TOMOSYNTHESIS BI: CPT | Performed by: RADIOLOGY

## 2024-01-22 ENCOUNTER — HOSPITAL ENCOUNTER (OUTPATIENT)
Dept: RADIOLOGY | Facility: CLINIC | Age: 69
Discharge: HOME | End: 2024-01-22
Payer: MEDICARE

## 2024-01-22 DIAGNOSIS — Z13.6 SCREENING FOR CARDIOVASCULAR CONDITION: ICD-10-CM

## 2024-01-22 PROCEDURE — 75571 CT HRT W/O DYE W/CA TEST: CPT

## 2024-02-06 ENCOUNTER — APPOINTMENT (OUTPATIENT)
Dept: PRIMARY CARE | Facility: CLINIC | Age: 69
End: 2024-02-06
Payer: MEDICARE

## 2024-02-08 ENCOUNTER — APPOINTMENT (OUTPATIENT)
Dept: PRIMARY CARE | Facility: CLINIC | Age: 69
End: 2024-02-08
Payer: MEDICARE

## 2024-02-16 DIAGNOSIS — M54.50 CHRONIC LOW BACK PAIN, UNSPECIFIED BACK PAIN LATERALITY, UNSPECIFIED WHETHER SCIATICA PRESENT: ICD-10-CM

## 2024-02-16 DIAGNOSIS — G89.29 CHRONIC LOW BACK PAIN, UNSPECIFIED BACK PAIN LATERALITY, UNSPECIFIED WHETHER SCIATICA PRESENT: ICD-10-CM

## 2024-02-16 RX ORDER — TRAMADOL HYDROCHLORIDE 50 MG/1
50 TABLET ORAL 3 TIMES DAILY
Qty: 270 TABLET | Refills: 0 | Status: SHIPPED | OUTPATIENT
Start: 2024-02-16 | End: 2024-05-24 | Stop reason: SDUPTHER

## 2024-03-07 ENCOUNTER — OFFICE VISIT (OUTPATIENT)
Dept: PRIMARY CARE | Facility: CLINIC | Age: 69
End: 2024-03-07
Payer: MEDICARE

## 2024-03-07 VITALS
HEART RATE: 57 BPM | DIASTOLIC BLOOD PRESSURE: 80 MMHG | BODY MASS INDEX: 39.42 KG/M2 | RESPIRATION RATE: 14 BRPM | TEMPERATURE: 97.5 F | SYSTOLIC BLOOD PRESSURE: 124 MMHG | WEIGHT: 242.4 LBS | OXYGEN SATURATION: 94 %

## 2024-03-07 DIAGNOSIS — Z79.899 MEDICATION MANAGEMENT: ICD-10-CM

## 2024-03-07 DIAGNOSIS — M54.50 CHRONIC LOW BACK PAIN, UNSPECIFIED BACK PAIN LATERALITY, UNSPECIFIED WHETHER SCIATICA PRESENT: Primary | ICD-10-CM

## 2024-03-07 DIAGNOSIS — I10 BENIGN ESSENTIAL HYPERTENSION: ICD-10-CM

## 2024-03-07 DIAGNOSIS — K58.9 IRRITABLE BOWEL SYNDROME, UNSPECIFIED TYPE: ICD-10-CM

## 2024-03-07 DIAGNOSIS — G89.29 CHRONIC LOW BACK PAIN, UNSPECIFIED BACK PAIN LATERALITY, UNSPECIFIED WHETHER SCIATICA PRESENT: Primary | ICD-10-CM

## 2024-03-07 PROCEDURE — 1036F TOBACCO NON-USER: CPT | Performed by: FAMILY MEDICINE

## 2024-03-07 PROCEDURE — 3074F SYST BP LT 130 MM HG: CPT | Performed by: FAMILY MEDICINE

## 2024-03-07 PROCEDURE — 1123F ACP DISCUSS/DSCN MKR DOCD: CPT | Performed by: FAMILY MEDICINE

## 2024-03-07 PROCEDURE — 99212 OFFICE O/P EST SF 10 MIN: CPT | Performed by: FAMILY MEDICINE

## 2024-03-07 PROCEDURE — 1160F RVW MEDS BY RX/DR IN RCRD: CPT | Performed by: FAMILY MEDICINE

## 2024-03-07 PROCEDURE — 1159F MED LIST DOCD IN RCRD: CPT | Performed by: FAMILY MEDICINE

## 2024-03-07 PROCEDURE — 1126F AMNT PAIN NOTED NONE PRSNT: CPT | Performed by: FAMILY MEDICINE

## 2024-03-07 PROCEDURE — 3008F BODY MASS INDEX DOCD: CPT | Performed by: FAMILY MEDICINE

## 2024-03-07 PROCEDURE — 3079F DIAST BP 80-89 MM HG: CPT | Performed by: FAMILY MEDICINE

## 2024-03-07 NOTE — PROGRESS NOTES
Subjective   Patient ID: Jenn Mistry is a 68 y.o. female who presents for Follow-up (Pt presents for 3 month follow up- pt states no other concerns at this time.).    HPI     Patient presents today for 3 month follow-up + CS visit.    Patient concerns:  No new concerns or issues. Patient is doing well overall.    ROUTINE VISIT  CHRONIC CONDITIONS:   Chronic low back pain/arthritis  Degenerative arthritis/chronic pain that has been historically well controlled on Tramadol 50 mg BID  Rates pain as 6-8 out of 10 without meds, brings down to a 0-1 with the medication.  Taking 2 tablets a day, about 12 hours apart but does need to use 3 times per day on occasion when active.  Tolerating well, no side effects reported.  Needs medication to be functional with day to day tasks.  Pain primarily in large joints - hips- and back.    CS Requirements:  OARRS reviewed 3/2024  CSA last completed 8/2023  UDS last completed 10/2023    Benign essential hypertension  Taking Carvedilol 12.5 mg BID, also on this for headaches.    Hyperglycemia  Lifestyle managed, mild elevation in A1c in past with normal fasting glucose.     11/2022 fasting glucose 88, hgb A1c 5.6  10/2021 fasting glucose 89, hgb A1c 5.8    Hyperlipidemia  Cholesterol historically well controlled on Rosuvastatin 10 mg daily.   Also takes aspirin 81 mg daily.  No CACS in our records, ordered 11/2023.  11/2022 TC/HDL ratio 2.6 (TRIG 133).    Migraine  Taking Carvedilol 12.5 mg BID for preventative measures.  Has sumatriptan for abortive measures.    Irritable bowel syndrome  Hyoscyamine PRN  Reports life changing for her.  Does have some dry mouth with this but no constipation or other side effects.  Dry mouth is not bad enough to stop or change med  Does not need refill at this time, will need in a few weeks.  No hematochezia, no melena.      Review of Systems   All other systems reviewed and are negative.      Objective   /80 (BP Location: Left arm, Patient  Position: Sitting, BP Cuff Size: Large adult)   Pulse 57   Temp 36.4 °C (97.5 °F) (Temporal)   Resp 14   Wt 110 kg (242 lb 6.4 oz)   SpO2 94%   BMI 39.42 kg/m²     Physical Exam  Vitals and nursing note reviewed.   Constitutional:       General: She is not in acute distress.     Appearance: Normal appearance. She is not toxic-appearing.   Neurological:      General: No focal deficit present.      Mental Status: She is alert and oriented to person, place, and time.   Psychiatric:         Mood and Affect: Mood normal.         Behavior: Behavior normal.      Comments: Discussion only visit today         Assessment/Plan   Problem List Items Addressed This Visit             ICD-10-CM    Benign essential hypertension I10     BP under good control, 124/80 in office today.  Continue Carvedilol 12.5 mg BID.         Chronic low back pain - Primary M54.50, G89.29     Stable, well controlled on Tramadol 50 mg BID or on the rare occasion TID, continue as prescribed.    Controlled Substance Visit -- I have personally reviewed the OARRS report for this patient. There is copy of report in electronic medical record. I have considered the risks of abuse, dependence, addiction, and diversion. I believe that it is clinically appropriate for this patient to be prescribed this medication.     CS Requirements:  OARRS reviewed 3/2024  CSA last completed 8/2023  UDS last completed 10/2023    Continue with controlled substance visits every 3 months.         Irritable bowel syndrome K58.9     Stable, doing well on the Hyoscyamine. She will call or message for refills when needed. Advised that if dry mouth becomes bothersome can reach out and can talk about alternatives.          Other Visit Diagnoses         Codes    Medication management     Z79.899            Follow-up in 3 months for routine care + CS visit.  Call for sooner follow-up if needed.       Scribe Attestation  By signing my name below, ILorena Scribe attest  that this documentation has been prepared under the direction and in the presence of Pam Galaviz DO.

## 2024-03-07 NOTE — ASSESSMENT & PLAN NOTE
Stable, doing well on the Hyoscyamine. She will call or message for refills when needed. Advised that if dry mouth becomes bothersome can reach out and can talk about alternatives.

## 2024-03-07 NOTE — ASSESSMENT & PLAN NOTE
Stable, well controlled on Tramadol 50 mg BID or on the rare occasion TID, continue as prescribed.    Controlled Substance Visit -- I have personally reviewed the OARRS report for this patient. There is copy of report in electronic medical record. I have considered the risks of abuse, dependence, addiction, and diversion. I believe that it is clinically appropriate for this patient to be prescribed this medication.     CS Requirements:  OARRS reviewed 3/2024  CSA last completed 8/2023  UDS last completed 10/2023    Continue with controlled substance visits every 3 months.

## 2024-04-04 DIAGNOSIS — K58.9 IRRITABLE BOWEL SYNDROME, UNSPECIFIED TYPE: ICD-10-CM

## 2024-04-04 RX ORDER — HYOSCYAMINE SULFATE 0.125 MG
0.12 TABLET ORAL EVERY 4 HOURS PRN
Qty: 270 TABLET | Refills: 0 | Status: SHIPPED | OUTPATIENT
Start: 2024-04-04

## 2024-05-06 ENCOUNTER — APPOINTMENT (OUTPATIENT)
Dept: PRIMARY CARE | Facility: CLINIC | Age: 69
End: 2024-05-06
Payer: MEDICARE

## 2024-05-24 DIAGNOSIS — I10 BENIGN ESSENTIAL HYPERTENSION: ICD-10-CM

## 2024-05-24 DIAGNOSIS — G89.29 CHRONIC LOW BACK PAIN, UNSPECIFIED BACK PAIN LATERALITY, UNSPECIFIED WHETHER SCIATICA PRESENT: ICD-10-CM

## 2024-05-24 DIAGNOSIS — G43.809 OTHER MIGRAINE WITHOUT STATUS MIGRAINOSUS, NOT INTRACTABLE: ICD-10-CM

## 2024-05-24 DIAGNOSIS — M54.50 CHRONIC LOW BACK PAIN, UNSPECIFIED BACK PAIN LATERALITY, UNSPECIFIED WHETHER SCIATICA PRESENT: ICD-10-CM

## 2024-05-24 RX ORDER — TRAMADOL HYDROCHLORIDE 50 MG/1
50 TABLET ORAL 3 TIMES DAILY
Qty: 270 TABLET | Refills: 0 | Status: SHIPPED | OUTPATIENT
Start: 2024-05-24

## 2024-05-24 RX ORDER — CARVEDILOL 12.5 MG/1
12.5 TABLET ORAL 2 TIMES DAILY
Qty: 180 TABLET | Refills: 0 | Status: SHIPPED | OUTPATIENT
Start: 2024-05-24 | End: 2025-05-24

## 2024-06-14 ENCOUNTER — APPOINTMENT (OUTPATIENT)
Dept: PRIMARY CARE | Facility: CLINIC | Age: 69
End: 2024-06-14
Payer: MEDICARE

## 2024-07-18 DIAGNOSIS — K58.9 IRRITABLE BOWEL SYNDROME, UNSPECIFIED TYPE: ICD-10-CM

## 2024-07-18 RX ORDER — HYOSCYAMINE SULFATE 0.125 MG
0.12 TABLET ORAL EVERY 4 HOURS PRN
Qty: 270 TABLET | Refills: 0 | Status: SHIPPED | OUTPATIENT
Start: 2024-07-18

## 2024-08-05 ENCOUNTER — LAB (OUTPATIENT)
Dept: LAB | Facility: LAB | Age: 69
End: 2024-08-05
Payer: MEDICARE

## 2024-08-05 DIAGNOSIS — I10 BENIGN ESSENTIAL HYPERTENSION: ICD-10-CM

## 2024-08-05 DIAGNOSIS — E78.5 HYPERLIPIDEMIA, UNSPECIFIED HYPERLIPIDEMIA TYPE: ICD-10-CM

## 2024-08-05 DIAGNOSIS — R73.9 HYPERGLYCEMIA: ICD-10-CM

## 2024-08-05 LAB
ALBUMIN SERPL BCP-MCNC: 4.3 G/DL (ref 3.4–5)
ALP SERPL-CCNC: 53 U/L (ref 33–136)
ALT SERPL W P-5'-P-CCNC: 12 U/L (ref 7–45)
ANION GAP SERPL CALC-SCNC: 13 MMOL/L (ref 10–20)
AST SERPL W P-5'-P-CCNC: 16 U/L (ref 9–39)
BILIRUB SERPL-MCNC: 0.6 MG/DL (ref 0–1.2)
BUN SERPL-MCNC: 19 MG/DL (ref 6–23)
CALCIUM SERPL-MCNC: 9.5 MG/DL (ref 8.6–10.6)
CHLORIDE SERPL-SCNC: 106 MMOL/L (ref 98–107)
CHOLEST SERPL-MCNC: 132 MG/DL (ref 0–199)
CHOLESTEROL/HDL RATIO: 2.5
CO2 SERPL-SCNC: 29 MMOL/L (ref 21–32)
CREAT SERPL-MCNC: 0.94 MG/DL (ref 0.5–1.05)
EGFRCR SERPLBLD CKD-EPI 2021: 66 ML/MIN/1.73M*2
ERYTHROCYTE [DISTWIDTH] IN BLOOD BY AUTOMATED COUNT: 13.2 % (ref 11.5–14.5)
EST. AVERAGE GLUCOSE BLD GHB EST-MCNC: 117 MG/DL
GLUCOSE SERPL-MCNC: 105 MG/DL (ref 74–99)
HBA1C MFR BLD: 5.7 %
HCT VFR BLD AUTO: 40.5 % (ref 36–46)
HDLC SERPL-MCNC: 51.9 MG/DL
HGB BLD-MCNC: 12.5 G/DL (ref 12–16)
LDLC SERPL CALC-MCNC: 52 MG/DL
MCH RBC QN AUTO: 29.8 PG (ref 26–34)
MCHC RBC AUTO-ENTMCNC: 30.9 G/DL (ref 32–36)
MCV RBC AUTO: 96 FL (ref 80–100)
NON HDL CHOLESTEROL: 80 MG/DL (ref 0–149)
NRBC BLD-RTO: 0 /100 WBCS (ref 0–0)
PLATELET # BLD AUTO: 276 X10*3/UL (ref 150–450)
POTASSIUM SERPL-SCNC: 5.3 MMOL/L (ref 3.5–5.3)
PROT SERPL-MCNC: 6.9 G/DL (ref 6.4–8.2)
RBC # BLD AUTO: 4.2 X10*6/UL (ref 4–5.2)
SODIUM SERPL-SCNC: 143 MMOL/L (ref 136–145)
TRIGL SERPL-MCNC: 141 MG/DL (ref 0–149)
VLDL: 28 MG/DL (ref 0–40)
WBC # BLD AUTO: 6 X10*3/UL (ref 4.4–11.3)

## 2024-08-05 PROCEDURE — 36415 COLL VENOUS BLD VENIPUNCTURE: CPT

## 2024-08-05 PROCEDURE — 83036 HEMOGLOBIN GLYCOSYLATED A1C: CPT

## 2024-08-05 PROCEDURE — 80053 COMPREHEN METABOLIC PANEL: CPT

## 2024-08-05 PROCEDURE — 80061 LIPID PANEL: CPT

## 2024-08-05 PROCEDURE — 85027 COMPLETE CBC AUTOMATED: CPT

## 2024-08-07 ENCOUNTER — APPOINTMENT (OUTPATIENT)
Dept: PRIMARY CARE | Facility: CLINIC | Age: 69
End: 2024-08-07
Payer: MEDICARE

## 2024-08-07 VITALS
SYSTOLIC BLOOD PRESSURE: 122 MMHG | BODY MASS INDEX: 38.9 KG/M2 | OXYGEN SATURATION: 97 % | TEMPERATURE: 98.5 F | HEART RATE: 64 BPM | WEIGHT: 239.2 LBS | DIASTOLIC BLOOD PRESSURE: 77 MMHG | RESPIRATION RATE: 14 BRPM

## 2024-08-07 DIAGNOSIS — G43.809 OTHER MIGRAINE WITHOUT STATUS MIGRAINOSUS, NOT INTRACTABLE: ICD-10-CM

## 2024-08-07 DIAGNOSIS — Z79.899 MEDICATION MANAGEMENT: ICD-10-CM

## 2024-08-07 DIAGNOSIS — K58.9 IRRITABLE BOWEL SYNDROME, UNSPECIFIED TYPE: ICD-10-CM

## 2024-08-07 DIAGNOSIS — E78.5 HYPERLIPIDEMIA, UNSPECIFIED HYPERLIPIDEMIA TYPE: ICD-10-CM

## 2024-08-07 DIAGNOSIS — R73.9 HYPERGLYCEMIA: ICD-10-CM

## 2024-08-07 DIAGNOSIS — M54.50 CHRONIC LOW BACK PAIN, UNSPECIFIED BACK PAIN LATERALITY, UNSPECIFIED WHETHER SCIATICA PRESENT: Primary | ICD-10-CM

## 2024-08-07 DIAGNOSIS — G89.29 CHRONIC LOW BACK PAIN, UNSPECIFIED BACK PAIN LATERALITY, UNSPECIFIED WHETHER SCIATICA PRESENT: Primary | ICD-10-CM

## 2024-08-07 DIAGNOSIS — I10 BENIGN ESSENTIAL HYPERTENSION: ICD-10-CM

## 2024-08-07 PROCEDURE — 99213 OFFICE O/P EST LOW 20 MIN: CPT | Performed by: FAMILY MEDICINE

## 2024-08-07 PROCEDURE — 1160F RVW MEDS BY RX/DR IN RCRD: CPT | Performed by: FAMILY MEDICINE

## 2024-08-07 PROCEDURE — 1159F MED LIST DOCD IN RCRD: CPT | Performed by: FAMILY MEDICINE

## 2024-08-07 PROCEDURE — 3078F DIAST BP <80 MM HG: CPT | Performed by: FAMILY MEDICINE

## 2024-08-07 PROCEDURE — 1036F TOBACCO NON-USER: CPT | Performed by: FAMILY MEDICINE

## 2024-08-07 PROCEDURE — 3074F SYST BP LT 130 MM HG: CPT | Performed by: FAMILY MEDICINE

## 2024-08-07 PROCEDURE — 1123F ACP DISCUSS/DSCN MKR DOCD: CPT | Performed by: FAMILY MEDICINE

## 2024-08-07 RX ORDER — HYOSCYAMINE SULFATE 0.125 MG
0.12 TABLET ORAL EVERY 4 HOURS PRN
Qty: 270 TABLET | Refills: 3 | Status: SHIPPED | OUTPATIENT
Start: 2024-08-07

## 2024-08-07 RX ORDER — CARVEDILOL 12.5 MG/1
12.5 TABLET ORAL 2 TIMES DAILY
Qty: 180 TABLET | Refills: 3 | Status: SHIPPED | OUTPATIENT
Start: 2024-08-07 | End: 2025-08-07

## 2024-08-07 RX ORDER — TRAMADOL HYDROCHLORIDE 50 MG/1
50 TABLET ORAL 3 TIMES DAILY
Qty: 270 TABLET | Refills: 0 | Status: SHIPPED | OUTPATIENT
Start: 2024-08-07

## 2024-08-07 RX ORDER — SUMATRIPTAN SUCCINATE 100 MG/1
100 TABLET ORAL AS NEEDED
Qty: 9 TABLET | Refills: 3 | Status: SHIPPED | OUTPATIENT
Start: 2024-08-07

## 2024-08-07 NOTE — ASSESSMENT & PLAN NOTE
Stable, doing well on the Hyoscyamine. Refilled today, only using as needed. Advised that if dry mouth becomes bothersome can reach out and can talk about alternatives.

## 2024-08-07 NOTE — ASSESSMENT & PLAN NOTE
Stable, well controlled on Tramadol 50 mg BID or on the rare occasion TID, continue as prescribed.    Controlled Substance Visit -- I have personally reviewed the OARRS report for this patient. There is copy of report in electronic medical record. I have considered the risks of abuse, dependence, addiction, and diversion. I believe that it is clinically appropriate for this patient to be prescribed this medication.     CS Requirements:  OARRS reviewed 8/2024  CSA last completed 8/2023 - updated today.  UDS last completed 10/2023 - repeat ordered today    Continue with controlled substance visits every 3 months.

## 2024-08-07 NOTE — ASSESSMENT & PLAN NOTE
Stable, continue Carvedilol 12.5 mg BID for preventative measures.  Continue sumatriptan for abortive measures, refilled today.

## 2024-08-07 NOTE — PROGRESS NOTES
Subjective   Patient ID: Jenn Mistry is a 68 y.o. female who presents for Follow-up (Pt presents for 3 month follow up- pt states no new concerns at this time.).    HPI     Patient presents today for 3 month follow-up + CS visit.    Patient concerns:  No new concerns or issues. Patient is doing well overall.  Needs refills.    ROUTINE VISIT  CHRONIC CONDITIONS:   Chronic low back pain/arthritis  Degenerative arthritis/chronic pain that has been historically well controlled on Tramadol 50 mg BID  Rates pain as 6-8 out of 10 without meds, brings down to a 0-1 with the medication.  Taking 2 tablets a day, about 12 hours apart but does need to use 3 times per day on occasion when active.  Tolerating well, no side effects reported.  Needs medication to be functional with day to day tasks.  Pain primarily in large joints - hips and back.  No sedation, no falls, no illicit drug use    CS Requirements:  OARRS reviewed 3/2024  CSA last completed 8/2023  UDS last completed 10/2023    Benign essential hypertension  Taking Carvedilol 12.5 mg BID, also on this for headaches.    Hyperglycemia  Lifestyle managed, mild elevation in A1c in past with normal fasting glucose.     08/2024 fasting glucose 105, hgb A1c 5.7  11/2022 fasting glucose 88, hgb A1c 5.6  10/2021 fasting glucose 89, hgb A1c 5.8    Hyperlipidemia  Cholesterol historically well controlled on Rosuvastatin 10 mg daily.   Also takes aspirin 81 mg daily.  01/2024 CACS was 0  08/2024 TC/HDL ratio 2.5 (TRIG 141).    Medication(s) are being taken as directed and tolerated well. No side effects reported.  Patient denies any facial droop, sudden vision loss, weakness or numbness on one side of body.   Patient denies chest pain, shortness of breath with exertion, palpitations, or symptoms of claudication.     Migraine  Taking Carvedilol 12.5 mg BID for preventative measures.  Has sumatriptan for abortive measures.    Irritable bowel syndrome  Hyoscyamine PRN, reports  diarrhea issues about once per month  Reports life changing for her.  Does have some dry mouth with this but no constipation or other side effects.  Dry mouth is not bad enough to stop or change med  No hematochezia, no melena.      Review of Systems   All other systems reviewed and are negative.      Objective   /77 (BP Location: Right arm, Patient Position: Sitting, BP Cuff Size: Large adult)   Pulse 64   Temp 36.9 °C (98.5 °F) (Temporal)   Resp 14   Wt 109 kg (239 lb 3.2 oz)   SpO2 97%   BMI 38.90 kg/m²     Physical Exam  Vitals and nursing note reviewed.   Constitutional:       General: She is not in acute distress.     Appearance: Normal appearance. She is not toxic-appearing.   Neurological:      General: No focal deficit present.      Mental Status: She is alert and oriented to person, place, and time.   Psychiatric:         Mood and Affect: Mood normal.         Behavior: Behavior normal.      Comments: Discussion only visit today         Assessment/Plan   Problem List Items Addressed This Visit             ICD-10-CM    Benign essential hypertension I10     BP under good control, 122/77 in office today.  Continue Carvedilol 12.5 mg BID.         Relevant Medications    carvedilol (Coreg) 12.5 mg tablet    Chronic low back pain - Primary M54.50, G89.29     Stable, well controlled on Tramadol 50 mg BID or on the rare occasion TID, continue as prescribed.    Controlled Substance Visit -- I have personally reviewed the OARRS report for this patient. There is copy of report in electronic medical record. I have considered the risks of abuse, dependence, addiction, and diversion. I believe that it is clinically appropriate for this patient to be prescribed this medication.     CS Requirements:  OARRS reviewed 8/2024  CSA last completed 8/2023 - updated today.  UDS last completed 10/2023 - repeat ordered today    Continue with controlled substance visits every 3 months.         Relevant Medications     traMADol (Ultram) 50 mg tablet    Other Relevant Orders    Opiate/Opioid/Benzo Prescription Compliance    Tramadol Confirmation, Urine    Hyperglycemia R73.9     08/2024 fasting glucose 105, hgb A1c 5.7  11/2022 fasting glucose 88, hgb A1c 5.6  10/2021 fasting glucose 89, hgb A1c 5.8    Most recent A1c in very mild prediabetic range, no medical therapy necessary at this level.  Diet and exercise recommendations revisited.  Will continue to monitor A1c on routine basis.    LIFESTYLE MEASURES  -consider increasing protein intake provided no issues with kidneys to 1 gram per 1 pound of ideal body weight per not to exceed 150 gram per day. May have to supplement with a protein powder to achieve this goal.  -make sure you are avoiding refined carbs such as breads, pasta, cereal, candy, soda,  nutrition bars, granola, chips, and sugar sweetened beverages.      -eat 5- 7 servings daily of veggies,  healthy protein such as chicken, fish,  beans, and eggs, and include healthy fats in your diet such as seeds, nuts, olive oil, avocados, and salmon.   -exercise 4 - 6 days per week as you are able, 150 minutes total weekly divided up is recommended with 3-4 of those days including resistance/strength training.         Hyperlipidemia E78.5     01/2024 CACS was 0 which is reassuring    08/2024 TC/HDL ratio 2.5 (TRIG 141).   Goal TC/HDL ratio 3.4 or less, LDL 99 or less,  or less, and TRIG 150 or less.     Patient's cholesterol panel is favorable, continue Rosuvastatin 10 mg daily.  Will monitor lipid panel on routine basis.         Migraine G43.909     Stable, continue Carvedilol 12.5 mg BID for preventative measures.  Continue sumatriptan for abortive measures, refilled today.         Relevant Medications    carvedilol (Coreg) 12.5 mg tablet    SUMAtriptan (Imitrex) 100 mg tablet    Irritable bowel syndrome K58.9     Stable, doing well on the Hyoscyamine. Refilled today, only using as needed. Advised that if dry mouth  becomes bothersome can reach out and can talk about alternatives.         Relevant Medications    hyoscyamine (Anaspaz, Levsin) 0.125 mg tablet     Other Visit Diagnoses         Codes    Medication management     Z79.899    Relevant Orders    Opiate/Opioid/Benzo Prescription Compliance    Tramadol Confirmation, Urine              Follow-up in 3 months for routine care + CS visit + MWV.  UDS ordered to be done prior.  Call for sooner follow-up if needed.       Scribe Attestation  By signing my name below, ILorena Scribe   attest that this documentation has been prepared under the direction and in the presence of Pam Galaviz DO.

## 2024-08-07 NOTE — ASSESSMENT & PLAN NOTE
01/2024 CACS was 0 which is reassuring    08/2024 TC/HDL ratio 2.5 (TRIG 141).   Goal TC/HDL ratio 3.4 or less, LDL 99 or less,  or less, and TRIG 150 or less.     Patient's cholesterol panel is favorable, continue Rosuvastatin 10 mg daily.  Will monitor lipid panel on routine basis.

## 2024-08-07 NOTE — ASSESSMENT & PLAN NOTE
08/2024 fasting glucose 105, hgb A1c 5.7  11/2022 fasting glucose 88, hgb A1c 5.6  10/2021 fasting glucose 89, hgb A1c 5.8    Most recent A1c in very mild prediabetic range, no medical therapy necessary at this level.  Diet and exercise recommendations revisited.  Will continue to monitor A1c on routine basis.    LIFESTYLE MEASURES  -consider increasing protein intake provided no issues with kidneys to 1 gram per 1 pound of ideal body weight per not to exceed 150 gram per day. May have to supplement with a protein powder to achieve this goal.  -make sure you are avoiding refined carbs such as breads, pasta, cereal, candy, soda,  nutrition bars, granola, chips, and sugar sweetened beverages.      -eat 5- 7 servings daily of veggies,  healthy protein such as chicken, fish,  beans, and eggs, and include healthy fats in your diet such as seeds, nuts, olive oil, avocados, and salmon.   -exercise 4 - 6 days per week as you are able, 150 minutes total weekly divided up is recommended with 3-4 of those days including resistance/strength training.

## 2024-08-15 DIAGNOSIS — R11.0 NAUSEA: Primary | ICD-10-CM

## 2024-08-15 RX ORDER — ONDANSETRON 8 MG/1
8 TABLET, ORALLY DISINTEGRATING ORAL EVERY 8 HOURS PRN
COMMUNITY
End: 2024-08-15

## 2024-08-15 RX ORDER — ONDANSETRON HYDROCHLORIDE 8 MG/1
8 TABLET, FILM COATED ORAL EVERY 8 HOURS PRN
COMMUNITY
End: 2024-08-15 | Stop reason: SDUPTHER

## 2024-08-15 RX ORDER — ONDANSETRON HYDROCHLORIDE 8 MG/1
8 TABLET, FILM COATED ORAL EVERY 8 HOURS PRN
Qty: 20 TABLET | Refills: 1 | Status: SHIPPED | OUTPATIENT
Start: 2024-08-15

## 2024-08-15 RX ORDER — MECLIZINE HYDROCHLORIDE 25 MG/1
25 TABLET ORAL 3 TIMES DAILY PRN
Qty: 30 TABLET | Refills: 1 | Status: SHIPPED | OUTPATIENT
Start: 2024-08-15

## 2024-10-03 ENCOUNTER — LAB (OUTPATIENT)
Dept: LAB | Facility: LAB | Age: 69
End: 2024-10-03
Payer: MEDICARE

## 2024-10-03 DIAGNOSIS — Z79.899 MEDICATION MANAGEMENT: ICD-10-CM

## 2024-10-03 DIAGNOSIS — G89.29 CHRONIC LOW BACK PAIN, UNSPECIFIED BACK PAIN LATERALITY, UNSPECIFIED WHETHER SCIATICA PRESENT: ICD-10-CM

## 2024-10-03 DIAGNOSIS — M54.50 CHRONIC LOW BACK PAIN, UNSPECIFIED BACK PAIN LATERALITY, UNSPECIFIED WHETHER SCIATICA PRESENT: ICD-10-CM

## 2024-10-03 PROCEDURE — 80346 BENZODIAZEPINES1-12: CPT

## 2024-10-03 PROCEDURE — 80368 SEDATIVE HYPNOTICS: CPT

## 2024-10-03 PROCEDURE — 80373 DRUG SCREENING TRAMADOL: CPT

## 2024-10-03 PROCEDURE — 80307 DRUG TEST PRSMV CHEM ANLYZR: CPT

## 2024-10-03 PROCEDURE — 80365 DRUG SCREENING OXYCODONE: CPT

## 2024-10-03 PROCEDURE — 82570 ASSAY OF URINE CREATININE: CPT

## 2024-10-03 PROCEDURE — 80361 OPIATES 1 OR MORE: CPT

## 2024-10-03 PROCEDURE — 80358 DRUG SCREENING METHADONE: CPT

## 2024-10-03 PROCEDURE — 80354 DRUG SCREENING FENTANYL: CPT

## 2024-10-04 LAB
AMPHETAMINES UR QL SCN: NORMAL
BARBITURATES UR QL SCN: NORMAL
BZE UR QL SCN: NORMAL
CANNABINOIDS UR QL SCN: NORMAL
CREAT UR-MCNC: 223.6 MG/DL (ref 20–320)
PCP UR QL SCN: NORMAL

## 2024-10-08 LAB
1OH-MIDAZOLAM UR CFM-MCNC: <25 NG/ML
6MAM UR CFM-MCNC: <25 NG/ML
7AMINOCLONAZEPAM UR CFM-MCNC: <25 NG/ML
A-OH ALPRAZ UR CFM-MCNC: <25 NG/ML
ALPRAZ UR CFM-MCNC: <25 NG/ML
CHLORDIAZEP UR CFM-MCNC: <25 NG/ML
CLONAZEPAM UR CFM-MCNC: <25 NG/ML
CODEINE UR CFM-MCNC: <50 NG/ML
DIAZEPAM UR CFM-MCNC: <25 NG/ML
EDDP UR CFM-MCNC: <25 NG/ML
FENTANYL UR CFM-MCNC: <2.5 NG/ML
HYDROCODONE CTO UR CFM-MCNC: <25 NG/ML
HYDROMORPHONE UR CFM-MCNC: <25 NG/ML
LORAZEPAM UR CFM-MCNC: <25 NG/ML
METHADONE UR CFM-MCNC: <25 NG/ML
MIDAZOLAM UR CFM-MCNC: <25 NG/ML
MORPHINE UR CFM-MCNC: <50 NG/ML
NORDIAZEPAM UR CFM-MCNC: <25 NG/ML
NORFENTANYL UR CFM-MCNC: <2.5 NG/ML
NORHYDROCODONE UR CFM-MCNC: <25 NG/ML
NOROXYCODONE UR CFM-MCNC: <25 NG/ML
NORTRAMADOL UR-MCNC: >1000 NG/ML
OXAZEPAM UR CFM-MCNC: <25 NG/ML
OXYCODONE UR CFM-MCNC: <25 NG/ML
OXYMORPHONE UR CFM-MCNC: <25 NG/ML
TEMAZEPAM UR CFM-MCNC: <25 NG/ML
TRAMADOL UR CFM-MCNC: >1000 NG/ML
ZOLPIDEM UR CFM-MCNC: <25 NG/ML
ZOLPIDEM UR-MCNC: <25 NG/ML

## 2024-11-04 DIAGNOSIS — M54.50 CHRONIC LOW BACK PAIN, UNSPECIFIED BACK PAIN LATERALITY, UNSPECIFIED WHETHER SCIATICA PRESENT: ICD-10-CM

## 2024-11-04 DIAGNOSIS — G89.29 CHRONIC LOW BACK PAIN, UNSPECIFIED BACK PAIN LATERALITY, UNSPECIFIED WHETHER SCIATICA PRESENT: ICD-10-CM

## 2024-11-04 DIAGNOSIS — K21.9 GASTROESOPHAGEAL REFLUX DISEASE WITHOUT ESOPHAGITIS: ICD-10-CM

## 2024-11-04 DIAGNOSIS — E78.5 HYPERLIPIDEMIA, UNSPECIFIED HYPERLIPIDEMIA TYPE: ICD-10-CM

## 2024-11-04 RX ORDER — PANTOPRAZOLE SODIUM 40 MG/1
40 TABLET, DELAYED RELEASE ORAL DAILY
Qty: 90 TABLET | Refills: 3 | Status: SHIPPED | OUTPATIENT
Start: 2024-11-04

## 2024-11-04 RX ORDER — ROSUVASTATIN CALCIUM 10 MG/1
10 TABLET, COATED ORAL NIGHTLY
Qty: 90 TABLET | Refills: 3 | Status: SHIPPED | OUTPATIENT
Start: 2024-11-04

## 2024-11-04 RX ORDER — TRAMADOL HYDROCHLORIDE 50 MG/1
50 TABLET ORAL 3 TIMES DAILY
Qty: 270 TABLET | Refills: 0 | Status: SHIPPED | OUTPATIENT
Start: 2024-11-04

## 2024-11-06 ENCOUNTER — APPOINTMENT (OUTPATIENT)
Dept: PRIMARY CARE | Facility: CLINIC | Age: 69
End: 2024-11-06
Payer: MEDICARE

## 2024-12-04 NOTE — ASSESSMENT & PLAN NOTE
Vaccines and screenings reviewed.  Questionnaires completed.  Health and wellness topics reviewed.  Diet and exercise recommendations revisited.  Routine blood work reviewed today.    VACCINES:  -Flu vaccine is utd  -TDAP is good until 2029  -Shingrix vaccines previously completed, good for lifetime.  -Pneumonia vaccines previously completed, good for lifetime.    SCREENINGS:  -Screening paps no longer indicated in ages 65+. Graduated.  -Screening mammogram due 11/2024. Ordered.  -Screening colonoscopy last completed 11/2022, TA x1, repeat in 5? years. UTD.  -Screening DEXA last completed 2021, since normal repeat in 2031. UTD.    LIFESTYLE MEASURES  -consider increasing protein intake provided no issues with kidneys to 1 gram per 1 pound of ideal body weight per not to exceed 150 gram per day. May have to supplement with a protein powder to achieve this goal.  -make sure you are avoiding refined carbs such as breads, pasta, cereal, candy, soda,  nutrition bars, granola, chips, and sugar sweetened beverages.      -eat 5- 7 servings daily of veggies,  healthy protein such as chicken, fish,  beans, and eggs, and include healthy fats in your diet such as seeds, nuts, olive oil, avocados, and salmon.   -exercise 4 - 6 days per week as you are able, 150 minutes total weekly divided up is recommended with 3-4 of those days including resistance/strength training.  -Vitamin D is recommended at 1000 - 5000 IU international units daily.   -Always wear sunscreen when you have sun exposure.  -64 oz of water is recommended daily.  -Dental visits recommended every 6 months.  -Eye exam recommended every 2 years, for those with vision problems every year.

## 2024-12-04 NOTE — PROGRESS NOTES
Subjective   Reason for Visit: Jenn Mistry is an 69 y.o. female here for a Medicare Wellness visit.     Past Medical, Surgical, and Family History reviewed and updated in chart.    Reviewed all medications by prescribing practitioner or clinical pharmacist (such as prescriptions, OTCs, herbal therapies and supplements) and documented in the medical record.    HPI    Patient presents today for routine FUV + CS visit + MWV  ABN was given to pt and signed, pt verbalized understanding.    Patient concerns:  No new concerns or issues, they are doing well overall.      MEDICARE WELLNESS VISIT   TDAP: 4/2019  SHINGRIX: completed  PNEUMOVAX: completed  PAP: 11/2019 HPV negative - graduated  MAMMO: 11/2023 (mother with hx of breast cancer)  CSCOPE: 11/2022 (TA x 1, diverticulosis, hemorrhoids)  DEXA: 11/2021 (normal) (due 11/2031)   HEP C SCREEN: 6/2018 negative  CACS: 0 in 1/2024    Patient flu vaccine is utd.    Diet: overall balanced    Exercise: both she and her  have exercise routines they do at the house separately, regularly practices getting up and down off the floor, periodically has to navigate small ladders/steps stools    Alcohol use: rarely  Smoking: never smoker    Dental: utd  Vision: utd  Hearing: no complaints reported  Foot care: manages on her own/pedicures, no complaints    Cervical cancer screening: graduated    Breast cancer screening: due  Reports family history in first degree relative - mom.  Denies lumps/bumps, skin changes, nipple retraction, or nipple drainage.    Bone density screening: utd  Denies family history in first degree relative.    Colon cancer screening: utd  Denies family history in first degree relative.  Denies melena, hematochezia, constipation, diarrhea, bloating, change in bowel habits.    ROUTINE VISIT  CHRONIC CONDITIONS:     Chronic low back pain/arthritis  Degenerative arthritis/chronic pain primarily in large joints (hips and back) that has been historically well  controlled on Tramadol 50 mg 2-3 times daily for years now. She requires pain medication to be functional with day to day tasks. Rates pain as 7-9 out of 10 without meds, brings down to a 0-1 with the medication. She is taking 2 tablets a day, about 12 hours apart but does need to use 3 times per day on occasion when active. She has tolerated Tramadol well at her low dose, no side effects reported, no increase in dose needed.     CS Requirements:  OARRS reviewed 12/5/2024  CSA last completed 8/7/2024  UDS last completed 10/3/2024    Stable, well controlled on Tramadol 50 mg BID or on the rare occasion TID.  Wishes to continue present regimen.  She is aware of fall risk on this medication.  She is aware not consume alcohol or other controlled substances with this medication.    Benign essential hypertension  Taking Carvedilol 12.5 mg BID, also on this for headaches.    Hyperglycemia  Lifestyle managed, mild elevation in A1c in past with normal fasting glucose.     08/2024 fasting glucose 105, hgb A1c 5.7  11/2022 fasting glucose 88, hgb A1c 5.6  10/2021 fasting glucose 89, hgb A1c 5.8    Hyperlipidemia  Cholesterol historically well controlled on Rosuvastatin 10 mg daily.   Also takes aspirin 81 mg daily.  01/2024 CACS was 0  08/2024 TC/HDL ratio 2.5 (TRIG 141).    Migraine  Taking Carvedilol 12.5 mg BID for preventative measures.  Has sumatriptan for abortive measures.    Irritable bowel syndrome  Hyoscyamine PRN, reports diarrhea issues about once per month  Reports life changing for her.  Does have some dry mouth with this but no constipation or other side effects.  Dry mouth is not bad enough to stop or change med  No hematochezia, no melena.    Patient Care Team:  Pam Galaviz DO as PCP - General  Pam Galaviz DO as PCP - [facility] Attributed Provider     Review of Systems   All other systems reviewed and are negative.      Objective   Vitals:  /79 (BP Location: Right arm, Patient  "Position: Sitting, BP Cuff Size: Large adult)   Pulse 67   Temp 36.2 °C (97.1 °F) (Temporal)   Ht 1.67 m (5' 5.75\")   Wt 109 kg (241 lb 4.8 oz)   SpO2 98%   BMI 39.24 kg/m²       Physical Exam  Vitals and nursing note reviewed.   Constitutional:       General: She is not in acute distress.     Appearance: Normal appearance. She is not toxic-appearing.   HENT:      Head: Normocephalic and atraumatic.   Cardiovascular:      Rate and Rhythm: Normal rate and regular rhythm.      Heart sounds: Normal heart sounds. No murmur heard.     No friction rub. No gallop.   Pulmonary:      Effort: Pulmonary effort is normal.      Breath sounds: Normal breath sounds. No wheezing, rhonchi or rales.   Abdominal:      Palpations: Abdomen is soft. There is no mass.      Tenderness: There is no abdominal tenderness. There is no guarding or rebound.   Musculoskeletal:      Right lower leg: No edema.      Left lower leg: No edema.   Neurological:      General: No focal deficit present.      Mental Status: She is alert and oriented to person, place, and time.   Psychiatric:         Mood and Affect: Mood normal.         Behavior: Behavior normal.         Assessment & Plan  Medicare annual wellness visit, subsequent  Vaccines and screenings reviewed.  Questionnaires completed.  Health and wellness topics reviewed.  Diet and exercise recommendations revisited.  Routine blood work reviewed today.    VACCINES:  -Flu vaccine is utd  -TDAP is good until 2029  -Shingrix vaccines previously completed, good for lifetime.  -Pneumonia vaccines previously completed, good for lifetime.    SCREENINGS:  -Screening paps no longer indicated in ages 65+. Graduated.  -Screening mammogram due 11/2024. Ordered.  -Screening colonoscopy last completed 11/2022, TA x1, repeat in 5? years. UTD.  -Screening DEXA last completed 2021, since normal repeat in 2031. UTD.    LIFESTYLE MEASURES  -consider increasing protein intake provided no issues with kidneys to 1 " gram per 1 pound of ideal body weight per not to exceed 150 gram per day. May have to supplement with a protein powder to achieve this goal.  -make sure you are avoiding refined carbs such as breads, pasta, cereal, candy, soda,  nutrition bars, granola, chips, and sugar sweetened beverages.      -eat 5- 7 servings daily of veggies,  healthy protein such as chicken, fish,  beans, and eggs, and include healthy fats in your diet such as seeds, nuts, olive oil, avocados, and salmon.   -exercise 4 - 6 days per week as you are able, 150 minutes total weekly divided up is recommended with 3-4 of those days including resistance/strength training.  -Vitamin D is recommended at 1000 - 5000 IU international units daily.   -Always wear sunscreen when you have sun exposure.  -64 oz of water is recommended daily.  -Dental visits recommended every 6 months.  -Eye exam recommended every 2 years, for those with vision problems every year.    Encounter for screening for other disorder    Class 2 obesity with body mass index (BMI) of 39.0 to 39.9 in adult, unspecified obesity type, unspecified whether serious comorbidity present    Other screening mammogram    Orders:    BI mammo bilateral screening tomosynthesis; Future    Chronic low back pain, unspecified back pain laterality, unspecified whether sciatica present  Degenerative arthritis/chronic pain primarily in large joints (hips and back) that has been historically well controlled on Tramadol 50 mg 2-3 times daily for years now. She requires pain medication to be functional with day to day tasks. Rates pain as 7-9 out of 10 without meds, brings down to a 0-1 with the medication. She is taking 2 tablets a day, about 12 hours apart but does need to use 3 times per day on occasion when active. She has tolerated Tramadol well at her low dose, no side effects reported, no increase in dose needed.    Stable, well controlled on Tramadol 50 mg BID or on the rare occasion TID, continue as  prescribed.    Controlled Substance Visit -- I have personally reviewed the OARRS report for this patient. There is copy of report in electronic medical record. I have considered the risks of abuse, dependence, addiction, and diversion. I believe that it is clinically appropriate for this patient to be prescribed this medication.     CS Requirements:  OARRS reviewed 12/5/2024  CSA last completed 8/7/2024  UDS last completed 10/3/2024    Continue with controlled substance visits every 3 months.    Orders:    traMADol (Ultram) 50 mg tablet; Take 1 tablet (50 mg) by mouth 3 times a day. Do not fill before December 31, 2024.    Benign essential hypertension  /79 in office today. Goal BP is 130/80 or less, ideally 120/80 or less.   BP well controlled, ccm with Carvedilol 12.5 mg BID which she is also on for headache prevention.         Hyperlipidemia, unspecified hyperlipidemia type  01/2024 CACS was 0 which is reassuring    08/2024 TC/HDL ratio 2.5 (TRIG 141).   Goal TC/HDL ratio 3.4 or less, LDL 99 or less,  or less, and TRIG 150 or less.     Patient's cholesterol panel is favorable, continue Rosuvastatin 10 mg daily.  Diet and exercise recommendations revisited.   Will monitor lipid panel on routine basis. UTD at this time.         Irritable bowel syndrome, unspecified type  Stable, doing well on the Hyoscyamine, only using as needed. CCM. Advised that if dry mouth becomes bothersome can reach out and can talk about alternatives.    Orders:    hyoscyamine (Anaspaz, Levsin) 0.125 mg tablet; Take 1 tablet (0.125 mg) by mouth every 4 hours if needed for diarrhea or cramping.    Hyperglycemia  08/2024 fasting glucose 105, hgb A1c 5.7  11/2022 fasting glucose 88, hgb A1c 5.6  10/2021 fasting glucose 89, hgb A1c 5.8    Most recent A1c in very mild prediabetic range, no medical therapy necessary at this level.  Diet and exercise recommendations revisited.  Will continue to monitor A1c on routine basis. UTD at  this time.         History of DVT (deep vein thrombosis)  Provoked x 1 in remote past (more than 10 years ago), no other issues with blood clots pre or post event.  No FMHx of clotting disorders.     Approximately 2 weeks post-op right foot surgery in 2014 or prior patient was found to have DVT of RLE  She completed recommended 90 day course of blood thinner then stopped.  Was advised that in future would need DVT prophylaxis if undergoing surgery but did not require daily OAC.    Continues without OAC at this time.       Gastroesophageal reflux disease, unspecified whether esophagitis present  Stable, doing well on Protonix  Recommend she take daily multivitamin since taking the Protonix.                Follow-up in 3 months for routine care + CS visit (tramadol).  Call for sooner follow-up if needed.             Scribe Attestation  By signing my name below, ILorena Scribe   attest that this documentation has been prepared under the direction and in the presence of Pam Galaviz DO.

## 2024-12-05 ENCOUNTER — APPOINTMENT (OUTPATIENT)
Dept: PRIMARY CARE | Facility: CLINIC | Age: 69
End: 2024-12-05
Payer: MEDICARE

## 2024-12-05 VITALS
OXYGEN SATURATION: 98 % | HEART RATE: 67 BPM | WEIGHT: 241.3 LBS | DIASTOLIC BLOOD PRESSURE: 79 MMHG | HEIGHT: 66 IN | TEMPERATURE: 97.1 F | BODY MASS INDEX: 38.78 KG/M2 | SYSTOLIC BLOOD PRESSURE: 125 MMHG

## 2024-12-05 DIAGNOSIS — M54.50 CHRONIC LOW BACK PAIN, UNSPECIFIED BACK PAIN LATERALITY, UNSPECIFIED WHETHER SCIATICA PRESENT: ICD-10-CM

## 2024-12-05 DIAGNOSIS — G89.29 CHRONIC LOW BACK PAIN, UNSPECIFIED BACK PAIN LATERALITY, UNSPECIFIED WHETHER SCIATICA PRESENT: ICD-10-CM

## 2024-12-05 DIAGNOSIS — Z12.31 OTHER SCREENING MAMMOGRAM: ICD-10-CM

## 2024-12-05 DIAGNOSIS — E78.5 HYPERLIPIDEMIA, UNSPECIFIED HYPERLIPIDEMIA TYPE: ICD-10-CM

## 2024-12-05 DIAGNOSIS — Z86.718 HISTORY OF DVT (DEEP VEIN THROMBOSIS): ICD-10-CM

## 2024-12-05 DIAGNOSIS — Z00.00 MEDICARE ANNUAL WELLNESS VISIT, SUBSEQUENT: Primary | ICD-10-CM

## 2024-12-05 DIAGNOSIS — K21.9 GASTROESOPHAGEAL REFLUX DISEASE, UNSPECIFIED WHETHER ESOPHAGITIS PRESENT: ICD-10-CM

## 2024-12-05 DIAGNOSIS — R73.9 HYPERGLYCEMIA: ICD-10-CM

## 2024-12-05 DIAGNOSIS — K58.9 IRRITABLE BOWEL SYNDROME, UNSPECIFIED TYPE: ICD-10-CM

## 2024-12-05 DIAGNOSIS — I10 BENIGN ESSENTIAL HYPERTENSION: ICD-10-CM

## 2024-12-05 DIAGNOSIS — E66.812 CLASS 2 OBESITY WITH BODY MASS INDEX (BMI) OF 39.0 TO 39.9 IN ADULT, UNSPECIFIED OBESITY TYPE, UNSPECIFIED WHETHER SERIOUS COMORBIDITY PRESENT: ICD-10-CM

## 2024-12-05 DIAGNOSIS — Z13.89 ENCOUNTER FOR SCREENING FOR OTHER DISORDER: ICD-10-CM

## 2024-12-05 PROBLEM — I82.409 DEEP VEIN THROMBOSIS (DVT) (MULTI): Status: ACTIVE | Noted: 2024-12-05

## 2024-12-05 PROBLEM — E55.9 VITAMIN D DEFICIENCY: Status: RESOLVED | Noted: 2024-12-05 | Resolved: 2024-12-05

## 2024-12-05 PROBLEM — E55.9 VITAMIN D DEFICIENCY: Status: ACTIVE | Noted: 2024-12-05

## 2024-12-05 PROCEDURE — 1159F MED LIST DOCD IN RCRD: CPT | Performed by: FAMILY MEDICINE

## 2024-12-05 PROCEDURE — G0439 PPPS, SUBSEQ VISIT: HCPCS | Performed by: FAMILY MEDICINE

## 2024-12-05 PROCEDURE — 1160F RVW MEDS BY RX/DR IN RCRD: CPT | Performed by: FAMILY MEDICINE

## 2024-12-05 PROCEDURE — 3074F SYST BP LT 130 MM HG: CPT | Performed by: FAMILY MEDICINE

## 2024-12-05 PROCEDURE — 1170F FXNL STATUS ASSESSED: CPT | Performed by: FAMILY MEDICINE

## 2024-12-05 PROCEDURE — 99213 OFFICE O/P EST LOW 20 MIN: CPT | Performed by: FAMILY MEDICINE

## 2024-12-05 PROCEDURE — 1036F TOBACCO NON-USER: CPT | Performed by: FAMILY MEDICINE

## 2024-12-05 PROCEDURE — 3008F BODY MASS INDEX DOCD: CPT | Performed by: FAMILY MEDICINE

## 2024-12-05 PROCEDURE — 1123F ACP DISCUSS/DSCN MKR DOCD: CPT | Performed by: FAMILY MEDICINE

## 2024-12-05 PROCEDURE — 3078F DIAST BP <80 MM HG: CPT | Performed by: FAMILY MEDICINE

## 2024-12-05 RX ORDER — TRAMADOL HYDROCHLORIDE 50 MG/1
50 TABLET ORAL 3 TIMES DAILY
Qty: 270 TABLET | Refills: 0 | Status: SHIPPED | OUTPATIENT
Start: 2024-12-31

## 2024-12-05 RX ORDER — TRAMADOL HYDROCHLORIDE 50 MG/1
50 TABLET ORAL 3 TIMES DAILY
Qty: 270 TABLET | Refills: 0 | Status: SHIPPED | OUTPATIENT
Start: 2024-12-05 | End: 2024-12-05

## 2024-12-05 RX ORDER — HYOSCYAMINE SULFATE 0.125 MG
0.12 TABLET ORAL EVERY 4 HOURS PRN
Qty: 270 TABLET | Refills: 3 | Status: SHIPPED | OUTPATIENT
Start: 2024-12-05

## 2024-12-05 ASSESSMENT — PATIENT HEALTH QUESTIONNAIRE - PHQ9
1. LITTLE INTEREST OR PLEASURE IN DOING THINGS: NOT AT ALL
2. FEELING DOWN, DEPRESSED OR HOPELESS: NOT AT ALL
SUM OF ALL RESPONSES TO PHQ9 QUESTIONS 1 AND 2: 0

## 2024-12-05 ASSESSMENT — ACTIVITIES OF DAILY LIVING (ADL)
GROCERY_SHOPPING: INDEPENDENT
BATHING: INDEPENDENT
TAKING_MEDICATION: INDEPENDENT
DRESSING: INDEPENDENT
MANAGING_FINANCES: INDEPENDENT
DOING_HOUSEWORK: INDEPENDENT

## 2024-12-05 NOTE — ASSESSMENT & PLAN NOTE
Stable, doing well on the Hyoscyamine, only using as needed. CCM. Advised that if dry mouth becomes bothersome can reach out and can talk about alternatives.    Orders:    hyoscyamine (Anaspaz, Levsin) 0.125 mg tablet; Take 1 tablet (0.125 mg) by mouth every 4 hours if needed for diarrhea or cramping.

## 2024-12-05 NOTE — ASSESSMENT & PLAN NOTE
Provoked x 1 in remote past (more than 10 years ago), no other issues with blood clots pre or post event.  No FMHx of clotting disorders.     Approximately 2 weeks post-op right foot surgery in 2014 or prior patient was found to have DVT of RLE  She completed recommended 90 day course of blood thinner then stopped.  Was advised that in future would need DVT prophylaxis if undergoing surgery but did not require daily OAC.    Continues without OAC at this time.

## 2024-12-05 NOTE — ASSESSMENT & PLAN NOTE
Degenerative arthritis/chronic pain primarily in large joints (hips and back) that has been historically well controlled on Tramadol 50 mg 2-3 times daily for years now. She requires pain medication to be functional with day to day tasks. Rates pain as 7-9 out of 10 without meds, brings down to a 0-1 with the medication. She is taking 2 tablets a day, about 12 hours apart but does need to use 3 times per day on occasion when active. She has tolerated Tramadol well at her low dose, no side effects reported, no increase in dose needed.    Stable, well controlled on Tramadol 50 mg BID or on the rare occasion TID, continue as prescribed.    Controlled Substance Visit -- I have personally reviewed the OARRS report for this patient. There is copy of report in electronic medical record. I have considered the risks of abuse, dependence, addiction, and diversion. I believe that it is clinically appropriate for this patient to be prescribed this medication.     CS Requirements:  OARRS reviewed 12/5/2024  CSA last completed 8/7/2024  UDS last completed 10/3/2024    Continue with controlled substance visits every 3 months.    Orders:    traMADol (Ultram) 50 mg tablet; Take 1 tablet (50 mg) by mouth 3 times a day. Do not fill before December 31, 2024.

## 2024-12-05 NOTE — ASSESSMENT & PLAN NOTE
Stable, doing well on Protonix  Recommend she take daily multivitamin since taking the Protonix.

## 2024-12-05 NOTE — ASSESSMENT & PLAN NOTE
01/2024 CACS was 0 which is reassuring    08/2024 TC/HDL ratio 2.5 (TRIG 141).   Goal TC/HDL ratio 3.4 or less, LDL 99 or less,  or less, and TRIG 150 or less.     Patient's cholesterol panel is favorable, continue Rosuvastatin 10 mg daily.  Diet and exercise recommendations revisited.   Will monitor lipid panel on routine basis. UTD at this time.

## 2024-12-05 NOTE — PATIENT INSTRUCTIONS
Medicare annual wellness visit, subsequent  Vaccines and screenings reviewed.  Questionnaires completed.  Health and wellness topics reviewed.  Diet and exercise recommendations revisited.  Routine blood work reviewed today.    VACCINES:  -Flu vaccine is utd  -TDAP is good until 2029  -Shingrix vaccines previously completed, good for lifetime.  -Pneumonia vaccines previously completed, good for lifetime.    SCREENINGS:  -Screening paps no longer indicated in ages 65+. Graduated.  -Screening mammogram due 11/2024. Ordered.  -Screening colonoscopy last completed 11/2022, TA x1, repeat in 5? years. UTD.  -Screening DEXA last completed 2021, since normal repeat in 2031. UTD.    LIFESTYLE MEASURES  -consider increasing protein intake provided no issues with kidneys to 1 gram per 1 pound of ideal body weight per not to exceed 150 gram per day. May have to supplement with a protein powder to achieve this goal.  -make sure you are avoiding refined carbs such as breads, pasta, cereal, candy, soda,  nutrition bars, granola, chips, and sugar sweetened beverages.      -eat 5- 7 servings daily of veggies,  healthy protein such as chicken, fish,  beans, and eggs, and include healthy fats in your diet such as seeds, nuts, olive oil, avocados, and salmon.   -exercise 4 - 6 days per week as you are able, 150 minutes total weekly divided up is recommended with 3-4 of those days including resistance/strength training.  -Vitamin D is recommended at 1000 - 5000 IU international units daily.   -Always wear sunscreen when you have sun exposure.  -64 oz of water is recommended daily.  -Dental visits recommended every 6 months.  -Eye exam recommended every 2 years, for those with vision problems every year.      Benign essential hypertension  /79 in office today. Goal BP is 130/80 or less, ideally 120/80 or less.   BP well controlled, ccm with Carvedilol 12.5 mg BID which she is also on for headache prevention.           Chronic low  back pain  Degenerative arthritis/chronic pain primarily in large joints (hips and back) that has been historically well controlled on Tramadol 50 mg 2-3 times daily for years now. She requires pain medication to be functional with day to day tasks. Rates pain as 7-9 out of 10 without meds, brings down to a 0-1 with the medication. She is taking 2 tablets a day, about 12 hours apart but does need to use 3 times per day on occasion when active. She has tolerated Tramadol well at her low dose, no side effects reported, no increase in dose needed.    Stable, well controlled on Tramadol 50 mg BID or on the rare occasion TID, continue as prescribed.    Controlled Substance Visit -- I have personally reviewed the OARRS report for this patient. There is copy of report in electronic medical record. I have considered the risks of abuse, dependence, addiction, and diversion. I believe that it is clinically appropriate for this patient to be prescribed this medication.     CS Requirements:  OARRS reviewed 12/5/2024  CSA last completed 8/7/2024  UDS last completed 10/3/2024    Continue with controlled substance visits every 3 months.    Orders:    traMADol (Ultram) 50 mg tablet; Take 1 tablet (50 mg) by mouth 3 times a day. Do not fill before December 31, 2024.      Irritable bowel syndrome  Stable, doing well on the Hyoscyamine, only using as needed. CCM. Advised that if dry mouth becomes bothersome can reach out and can talk about alternatives.    Orders:    hyoscyamine (Anaspaz, Levsin) 0.125 mg tablet; Take 1 tablet (0.125 mg) by mouth every 4 hours if needed for diarrhea or cramping.      Hyperlipidemia  01/2024 CACS was 0 which is reassuring    08/2024 TC/HDL ratio 2.5 (TRIG 141).   Goal TC/HDL ratio 3.4 or less, LDL 99 or less,  or less, and TRIG 150 or less.     Patient's cholesterol panel is favorable, continue Rosuvastatin 10 mg daily.  Diet and exercise recommendations revisited.   Will monitor lipid panel on  routine basis. UTD at this time.           Hyperglycemia  08/2024 fasting glucose 105, hgb A1c 5.7  11/2022 fasting glucose 88, hgb A1c 5.6  10/2021 fasting glucose 89, hgb A1c 5.8    Most recent A1c in very mild prediabetic range, no medical therapy necessary at this level.  Diet and exercise recommendations revisited.  Will continue to monitor A1c on routine basis. UTD at this time.           GERD (gastroesophageal reflux disease)  Stable, doing well on Protonix  Recommend she take daily multivitamin since taking the Protonix.           History of DVT (deep vein thrombosis)  Provoked x 1 in remote past (more than 10 years ago), no other issues with blood clots pre or post event.  No FMHx of clotting disorders.     Approximately 2 weeks post-op right foot surgery in 2014 or prior patient was found to have DVT of RLE  She completed recommended 90 day course of blood thinner then stopped.  Was advised that in future would need DVT prophylaxis if undergoing surgery but did not require daily OAC.    Continues without OAC at this time.       Follow up Dr Santos 3 mo for CSA tramadol

## 2024-12-05 NOTE — ASSESSMENT & PLAN NOTE
08/2024 fasting glucose 105, hgb A1c 5.7  11/2022 fasting glucose 88, hgb A1c 5.6  10/2021 fasting glucose 89, hgb A1c 5.8    Most recent A1c in very mild prediabetic range, no medical therapy necessary at this level.  Diet and exercise recommendations revisited.  Will continue to monitor A1c on routine basis. UTD at this time.

## 2024-12-05 NOTE — ASSESSMENT & PLAN NOTE
/79 in office today. Goal BP is 130/80 or less, ideally 120/80 or less.   BP well controlled, ccm with Carvedilol 12.5 mg BID which she is also on for headache prevention.

## 2025-01-03 ENCOUNTER — HOSPITAL ENCOUNTER (OUTPATIENT)
Dept: RADIOLOGY | Facility: CLINIC | Age: 70
Discharge: HOME | End: 2025-01-03
Payer: MEDICARE

## 2025-01-03 DIAGNOSIS — Z12.31 OTHER SCREENING MAMMOGRAM: ICD-10-CM

## 2025-01-03 PROCEDURE — 77067 SCR MAMMO BI INCL CAD: CPT

## 2025-02-03 DIAGNOSIS — G89.29 CHRONIC LOW BACK PAIN, UNSPECIFIED BACK PAIN LATERALITY, UNSPECIFIED WHETHER SCIATICA PRESENT: ICD-10-CM

## 2025-02-03 DIAGNOSIS — M54.50 CHRONIC LOW BACK PAIN, UNSPECIFIED BACK PAIN LATERALITY, UNSPECIFIED WHETHER SCIATICA PRESENT: ICD-10-CM

## 2025-02-03 RX ORDER — TRAMADOL HYDROCHLORIDE 50 MG/1
50 TABLET ORAL EVERY 8 HOURS PRN
Qty: 90 TABLET | Refills: 1 | Status: SHIPPED | OUTPATIENT
Start: 2025-02-03

## 2025-02-03 NOTE — TELEPHONE ENCOUNTER
BRIEF NOTE    Subjective/Objective:  Patient is new to me as PCP, as Dr. Galaviz left the practice in January.     Pharmacy refill request for tramadol. CSA and UDS with SV[facility].    Assessment/Plan:  1. Chronic low back pain, unspecified back pain laterality, unspecified whether sciatica present  - Need CSA, UDS  - traMADol (Ultram) 50 mg tablet; Take 1 tablet (50 mg) by mouth every 8 hours if needed for severe pain (7 - 10).  Dispense: 90 tablet; Refill: 1      No problem-specific Assessment & Plan notes found for this encounter.        Steffanie Santos DO, [place]  [facility]  Office: [phone]  2/3/2025 4:02 PM

## 2025-03-13 ENCOUNTER — APPOINTMENT (OUTPATIENT)
Dept: PRIMARY CARE | Facility: CLINIC | Age: 70
End: 2025-03-13
Payer: MEDICARE

## 2025-03-13 VITALS
SYSTOLIC BLOOD PRESSURE: 135 MMHG | DIASTOLIC BLOOD PRESSURE: 82 MMHG | WEIGHT: 237.8 LBS | BODY MASS INDEX: 38.67 KG/M2 | TEMPERATURE: 97.8 F | HEART RATE: 58 BPM | OXYGEN SATURATION: 97 %

## 2025-03-13 DIAGNOSIS — M54.50 CHRONIC LOW BACK PAIN, UNSPECIFIED BACK PAIN LATERALITY, UNSPECIFIED WHETHER SCIATICA PRESENT: Primary | ICD-10-CM

## 2025-03-13 DIAGNOSIS — G89.29 CHRONIC LOW BACK PAIN, UNSPECIFIED BACK PAIN LATERALITY, UNSPECIFIED WHETHER SCIATICA PRESENT: Primary | ICD-10-CM

## 2025-03-13 DIAGNOSIS — K58.9 IRRITABLE BOWEL SYNDROME, UNSPECIFIED TYPE: ICD-10-CM

## 2025-03-13 DIAGNOSIS — Z51.81 MEDICATION MONITORING ENCOUNTER: ICD-10-CM

## 2025-03-13 PROCEDURE — 1159F MED LIST DOCD IN RCRD: CPT | Performed by: STUDENT IN AN ORGANIZED HEALTH CARE EDUCATION/TRAINING PROGRAM

## 2025-03-13 PROCEDURE — 1123F ACP DISCUSS/DSCN MKR DOCD: CPT | Performed by: STUDENT IN AN ORGANIZED HEALTH CARE EDUCATION/TRAINING PROGRAM

## 2025-03-13 PROCEDURE — 1160F RVW MEDS BY RX/DR IN RCRD: CPT | Performed by: STUDENT IN AN ORGANIZED HEALTH CARE EDUCATION/TRAINING PROGRAM

## 2025-03-13 PROCEDURE — 3079F DIAST BP 80-89 MM HG: CPT | Performed by: STUDENT IN AN ORGANIZED HEALTH CARE EDUCATION/TRAINING PROGRAM

## 2025-03-13 PROCEDURE — 3075F SYST BP GE 130 - 139MM HG: CPT | Performed by: STUDENT IN AN ORGANIZED HEALTH CARE EDUCATION/TRAINING PROGRAM

## 2025-03-13 PROCEDURE — 99214 OFFICE O/P EST MOD 30 MIN: CPT | Performed by: STUDENT IN AN ORGANIZED HEALTH CARE EDUCATION/TRAINING PROGRAM

## 2025-03-13 RX ORDER — TRAMADOL HYDROCHLORIDE 50 MG/1
50 TABLET ORAL EVERY 8 HOURS PRN
Qty: 90 TABLET | Refills: 1 | Status: SHIPPED | OUTPATIENT
Start: 2025-04-02

## 2025-03-13 RX ORDER — HYOSCYAMINE SULFATE 0.125 MG
.125-.25 TABLET ORAL EVERY 4 HOURS PRN
Qty: 270 TABLET | Refills: 3 | Status: SHIPPED | OUTPATIENT
Start: 2025-03-13

## 2025-03-13 NOTE — PROGRESS NOTES
FAMILY MEDICINE  OFFICE VISIT   Jenn Mistry  [id number]  1955    PCP: Steffanie Santos DO     Chief Complaint:   Chief Complaint   Patient presents with    3 month follow up     Pt presents for 3 month follow up.  Pt states she has no concerns at this time.       SUBJECTIVE     Jenn Mistry is a 69 y.o. English-speaking female with pertinent PMHx of chronic LBP, who presents to the clinic with complaints of follow-up .    Back Pain  - q3mo for CS     Migraine  - Hasn't had to use the Imitrex recently.   - Maybe 2 months ago.   - Hasn't really had since [facility] is over    Vertigo   - Hasn't had anymore  - Was bending over and over multiple times.   - Has used the Zofran intermittnetly     Diarrhea  - Levsin was life changing.   - Used to have diarrhea like crazy.   - Some foods are still kryptonite.   - When traveling     Controlled Substances  - UDS collected: 10/204  - PDMP reviewed: yes  - THC use: denies  - Medical Marijuana: denies  - Illegal substance use: denies  - Do they use controlled substance daily: yes   - 1 in AM   - 1 in PM   - PRN at lunch depending on the physical activity       HPI      The following portions of the patient's chart were reviewed in this encounter and updated as appropriate:  Tobacco  Allergies  Meds  Problems  Med Hx  Surg Hx  Fam Hx         Home Medication List:  Current Outpatient Medications   Medication Instructions    calcium 500 mg calcium (1,250 mg) tablet 1 tablet, 2 times daily (morning and late afternoon)    carvedilol (COREG) 12.5 mg, oral, 2 times daily    cholecalciferol (VITAMIN D3) 25 mcg, Daily    hyoscyamine (ANASPAZ, LEVSIN) 0.125 mg, oral, Every 4 hours PRN    meclizine (ANTIVERT) 25 mg, oral, 3 times daily PRN    ondansetron (ZOFRAN) 8 mg, oral, Every 8 hours PRN    pantoprazole (PROTONIX) 40 mg, oral, Daily    psyllium (Metamucil) 0.4 gram capsule Take by mouth.    rosuvastatin (CRESTOR) 10 mg, oral, Nightly    SUMAtriptan (IMITREX) 100  mg, oral, As needed, take 1 tablet at onset of migraine. May repeat in 2 hours if needed    traMADol (ULTRAM) 50 mg, oral, Every 8 hours PRN    TURMERIC ORAL Take by mouth.         OBJECTIVE   /82 (BP Location: Right arm, Patient Position: Sitting, BP Cuff Size: Large adult)   Pulse 58   Temp 36.6 °C (97.8 °F) (Temporal)   Wt 108 kg (237 lb 12.8 oz)   SpO2 97%   BMI 38.67 kg/m²   Vital signs and pulse oximetry reviewed.     Physical Exam  Vitals and nursing note reviewed.   Constitutional:       Appearance: Normal appearance.   HENT:      Head: Normocephalic and atraumatic.      Right Ear: Tympanic membrane, ear canal and external ear normal.      Left Ear: Tympanic membrane, ear canal and external ear normal.      Nose: Nose normal. No congestion or rhinorrhea.   Eyes:      General: No scleral icterus.     Conjunctiva/sclera: Conjunctivae normal.   Cardiovascular:      Rate and Rhythm: Normal rate and regular rhythm.      Heart sounds: No murmur heard.  Pulmonary:      Effort: Pulmonary effort is normal. No respiratory distress.      Breath sounds: Normal breath sounds. No wheezing, rhonchi or rales.   Musculoskeletal:      Right lower leg: No edema.      Left lower leg: No edema.   Skin:     General: Skin is warm.      Coloration: Skin is not jaundiced.   Neurological:      Mental Status: She is alert. Mental status is at baseline.   Psychiatric:         Mood and Affect: Mood normal.         Behavior: Behavior normal.       ASSESSMENT & PLAN     Problem List Items Addressed This Visit       Chronic low back pain - Primary    Overview     Degenerative arthritis/chronic pain primarily in large joints (hips and back) that has been historically well controlled on Tramadol 50 mg 2-3 times daily for years now. She requires pain medication to be functional with day to day tasks. Rates pain as 7-9 out of 10 without meds, brings down to a 0-1 with the medication. She is taking 2 tablets a day, about 12 hours  apart but does need to use 3 times per day on occasion when active. She has tolerated Tramadol well at her low dose, no side effects reported, no increase in dose needed. She is aware of fall risk on this medication. She is aware not consume alcohol or other controlled substances with this medication.    CS Requirements:  OARRS reviewed 3/13/2025  CSA last completed 3/13/2025  UDS last completed 3/13/2025         Current Assessment & Plan     Patient has been on a stable dose of the tramadol for some time. Takes 1 tablet in AM and 1 in PM, PRN dose around lunch depending on activity.   - UDS collected today.   - CSA collected today.   - Will send tramadol.   - q3mo appts.          Relevant Medications    traMADol (Ultram) 50 mg tablet    Other Relevant Orders    Follow Up In [facility] - PCP - Established    Irritable bowel syndrome    Overview     Hyoscyamine PRN         Relevant Medications    hyoscyamine (Anaspaz, Levsin) 0.125 mg tablet     Other Visit Diagnoses       Medication monitoring encounter                Level 4    Follow-Up Recommendations: q3mo for CS    Please excuse any typos or grammatical errors, part of this note was constructed with Dragon dictation software.    Steffanie Santos DO, [place]  [facility]   Office: [phone]  3/13/2025 9:45 PM

## 2025-04-07 NOTE — ASSESSMENT & PLAN NOTE
Patient has been on a stable dose of the tramadol for some time. Takes 1 tablet in AM and 1 in PM, PRN dose around lunch depending on activity.   - UDS collected today.   - CSA collected today.   - Will send tramadol.   - q3mo appts.

## 2025-04-16 ENCOUNTER — PATIENT MESSAGE (OUTPATIENT)
Dept: PRIMARY CARE | Facility: CLINIC | Age: 70
End: 2025-04-16
Payer: MEDICARE

## 2025-04-16 DIAGNOSIS — M54.50 CHRONIC LOW BACK PAIN, UNSPECIFIED BACK PAIN LATERALITY, UNSPECIFIED WHETHER SCIATICA PRESENT: ICD-10-CM

## 2025-04-16 DIAGNOSIS — G89.29 CHRONIC LOW BACK PAIN, UNSPECIFIED BACK PAIN LATERALITY, UNSPECIFIED WHETHER SCIATICA PRESENT: ICD-10-CM

## 2025-04-16 RX ORDER — TRAMADOL HYDROCHLORIDE 50 MG/1
50 TABLET ORAL EVERY 8 HOURS PRN
Qty: 90 TABLET | Refills: 1 | Status: CANCELLED | OUTPATIENT
Start: 2025-04-16

## 2025-04-17 DIAGNOSIS — G89.29 CHRONIC LOW BACK PAIN, UNSPECIFIED BACK PAIN LATERALITY, UNSPECIFIED WHETHER SCIATICA PRESENT: ICD-10-CM

## 2025-04-17 DIAGNOSIS — M54.50 CHRONIC LOW BACK PAIN, UNSPECIFIED BACK PAIN LATERALITY, UNSPECIFIED WHETHER SCIATICA PRESENT: ICD-10-CM

## 2025-04-17 RX ORDER — TRAMADOL HYDROCHLORIDE 50 MG/1
50 TABLET ORAL EVERY 8 HOURS PRN
Qty: 90 TABLET | Refills: 1 | Status: SHIPPED | OUTPATIENT
Start: 2025-04-17

## 2025-04-17 NOTE — TELEPHONE ENCOUNTER
Please call patient and have her call Nery Murillo, Rx was already sent. I sent at time of the appt to start after 4/2/25.     Steffanie Santos DO, MSEd  [facility]  Office: [phone]  4/16/2025 8:28 PM

## 2025-06-16 ENCOUNTER — APPOINTMENT (OUTPATIENT)
Dept: PRIMARY CARE | Facility: CLINIC | Age: 70
End: 2025-06-16
Payer: MEDICARE

## 2025-06-16 VITALS
SYSTOLIC BLOOD PRESSURE: 139 MMHG | WEIGHT: 233.7 LBS | TEMPERATURE: 98.6 F | RESPIRATION RATE: 12 BRPM | DIASTOLIC BLOOD PRESSURE: 84 MMHG | BODY MASS INDEX: 38.01 KG/M2 | OXYGEN SATURATION: 96 % | HEART RATE: 68 BPM

## 2025-06-16 DIAGNOSIS — G89.29 CHRONIC LOW BACK PAIN, UNSPECIFIED BACK PAIN LATERALITY, UNSPECIFIED WHETHER SCIATICA PRESENT: Primary | ICD-10-CM

## 2025-06-16 DIAGNOSIS — M54.50 CHRONIC LOW BACK PAIN, UNSPECIFIED BACK PAIN LATERALITY, UNSPECIFIED WHETHER SCIATICA PRESENT: Primary | ICD-10-CM

## 2025-06-16 DIAGNOSIS — I10 BENIGN ESSENTIAL HYPERTENSION: ICD-10-CM

## 2025-06-16 DIAGNOSIS — Z51.81 MEDICATION MONITORING ENCOUNTER: ICD-10-CM

## 2025-06-16 DIAGNOSIS — G43.809 OTHER MIGRAINE WITHOUT STATUS MIGRAINOSUS, NOT INTRACTABLE: ICD-10-CM

## 2025-06-16 DIAGNOSIS — R73.03 PREDIABETES: ICD-10-CM

## 2025-06-16 PROCEDURE — 1036F TOBACCO NON-USER: CPT | Performed by: STUDENT IN AN ORGANIZED HEALTH CARE EDUCATION/TRAINING PROGRAM

## 2025-06-16 PROCEDURE — 1159F MED LIST DOCD IN RCRD: CPT | Performed by: STUDENT IN AN ORGANIZED HEALTH CARE EDUCATION/TRAINING PROGRAM

## 2025-06-16 PROCEDURE — 99214 OFFICE O/P EST MOD 30 MIN: CPT | Performed by: STUDENT IN AN ORGANIZED HEALTH CARE EDUCATION/TRAINING PROGRAM

## 2025-06-16 PROCEDURE — 3075F SYST BP GE 130 - 139MM HG: CPT | Performed by: STUDENT IN AN ORGANIZED HEALTH CARE EDUCATION/TRAINING PROGRAM

## 2025-06-16 PROCEDURE — 3079F DIAST BP 80-89 MM HG: CPT | Performed by: STUDENT IN AN ORGANIZED HEALTH CARE EDUCATION/TRAINING PROGRAM

## 2025-06-16 PROCEDURE — 1160F RVW MEDS BY RX/DR IN RCRD: CPT | Performed by: STUDENT IN AN ORGANIZED HEALTH CARE EDUCATION/TRAINING PROGRAM

## 2025-06-16 RX ORDER — CARVEDILOL 12.5 MG/1
12.5 TABLET ORAL 2 TIMES DAILY
Qty: 180 TABLET | Refills: 3 | Status: SHIPPED | OUTPATIENT
Start: 2025-06-16 | End: 2026-06-16

## 2025-06-16 RX ORDER — TRAMADOL HYDROCHLORIDE 50 MG/1
50 TABLET, FILM COATED ORAL EVERY 8 HOURS PRN
Qty: 90 TABLET | Refills: 2 | Status: SHIPPED | OUTPATIENT
Start: 2025-06-16

## 2025-06-16 NOTE — PROGRESS NOTES
FAMILY MEDICINE  OFFICE VISIT   Jenn Mistry  [id number]  1955    PCP: Steffanie Santos DO     Chief Complaint:   Chief Complaint   Patient presents with    Follow-up     No Concerns     SUBJECTIVE     Jenn Mistry is a 69 y.o. English-speaking female, who presents to the clinic with complaints of follow-up.    History of Present Illness  Jenn Mistry is a 69 year old female who presents for a follow-up visit.    She is currently taking rosuvastatin for cholesterol management, carvedilol for hypertension, tramadol for chronic pain, and Imitrex for migraines. No issues with her current medications and no recent headaches. Back is stable, no worse, no better.     Her  maintains a detailed spreadsheet of her medications, which she finds helpful in ensuring she has enough medication until her next visit.    She is involved in a home renovation project, including painting and recarpeting, which has been keeping her busy. She manages the organization of the project with her 's help.      HPI      The following portions of the patient's chart were reviewed in this encounter and updated as appropriate:  Tobacco  Allergies  Meds  Problems  Med Hx  Surg Hx  Fam Hx         Home Medication List:  Current Outpatient Medications   Medication Instructions    calcium 500 mg calcium (1,250 mg) tablet 1 tablet, 2 times daily (morning and late afternoon)    carvedilol (COREG) 12.5 mg, oral, 2 times daily    cholecalciferol (VITAMIN D3) 25 mcg, Daily    hyoscyamine (ANASPAZ, LEVSIN) 0.125-0.25 mg, oral, Every 4 hours PRN    meclizine (ANTIVERT) 25 mg, oral, 3 times daily PRN    ondansetron (ZOFRAN) 8 mg, oral, Every 8 hours PRN    pantoprazole (PROTONIX) 40 mg, oral, Daily    psyllium (Metamucil) 0.4 gram capsule Take by mouth.    rosuvastatin (CRESTOR) 10 mg, oral, Nightly    SUMAtriptan (IMITREX) 100 mg, oral, As needed, take 1 tablet at onset of migraine. May repeat in 2 hours if needed     traMADol (ULTRAM) 50 mg, oral, Every 8 hours PRN    TURMERIC ORAL Take by mouth.         OBJECTIVE   /84 (BP Location: Right arm, Patient Position: Sitting, BP Cuff Size: Large adult)   Pulse 68   Temp 37 °C (98.6 °F) (Temporal)   Resp 12   Wt 106 kg (233 lb 11.2 oz)   SpO2 96%   BMI 38.01 kg/m²   Vital signs and pulse oximetry reviewed.     Physical Exam  Vitals and nursing note reviewed.   Constitutional:       Appearance: Normal appearance.   HENT:      Head: Normocephalic and atraumatic.      Right Ear: Tympanic membrane, ear canal and external ear normal.      Left Ear: Tympanic membrane, ear canal and external ear normal.      Nose: Nose normal. No congestion or rhinorrhea.   Eyes:      General: No scleral icterus.     Conjunctiva/sclera: Conjunctivae normal.   Cardiovascular:      Rate and Rhythm: Normal rate and regular rhythm.      Heart sounds: No murmur heard.  Pulmonary:      Effort: Pulmonary effort is normal. No respiratory distress.      Breath sounds: Normal breath sounds. No wheezing, rhonchi or rales.   Musculoskeletal:      Cervical back: No rigidity or tenderness.      Right lower leg: No edema.      Left lower leg: No edema.   Lymphadenopathy:      Cervical: No cervical adenopathy.   Skin:     General: Skin is warm.      Coloration: Skin is not jaundiced.   Neurological:      Mental Status: She is alert. Mental status is at baseline.   Psychiatric:         Mood and Affect: Mood normal.         Behavior: Behavior normal.         Physical Exam  CHEST: Lungs clear to auscultation.    Results        ASSESSMENT & PLAN     Problem List Items Addressed This Visit       Benign essential hypertension    Overview   Taking Carvedilol 12.5 mg BID, also on this for headaches.         Relevant Medications    carvedilol (Coreg) 12.5 mg tablet    Other Relevant Orders    TSH with reflex to Free T4 if abnormal    Vitamin B12    CBC and Auto Differential    Comprehensive Metabolic Panel    Hemoglobin  A1C    Lipid Panel    Follow Up In [facility] - PCP - Hospit[facility] in [place]    Follow Up In [facility] - PCP - Medicare Annual    Chronic low back pain - Primary    Overview   Degenerative arthritis/chronic pain primarily in large joints (hips and back) that has been historically well controlled on Tramadol 50 mg 2-3 times daily for years now. She requires pain medication to be functional with day to day tasks. Rates pain as 7-9 out of 10 without meds, brings down to a 0-1 with the medication. She is taking 2 tablets a day, about 12 hours apart but does need to use 3 times per day on occasion when active. She has tolerated Tramadol well at her low dose, no side effects reported, no increase in dose needed. She is aware of fall risk on this medication. She is aware not consume alcohol or other controlled substances with this medication.    CS Requirements:  OARRS reviewed 3/13/2025  CSA last completed 3/13/2025  UDS last completed 3/13/2025         Relevant Medications    traMADol (Ultram) 50 mg tablet    Other Relevant Orders    Opiate/Opioid/Benzo Prescription Compliance    TSH with reflex to Free T4 if abnormal    Vitamin B12    CBC and Auto Differential    Comprehensive Metabolic Panel    Hemoglobin A1C    Lipid Panel    Migraine    Overview   Taking Carvedilol 12.5 mg BID for preventative measures.  Has sumatriptan for abortive measures.         Relevant Medications    carvedilol (Coreg) 12.5 mg tablet    Other Relevant Orders    TSH with reflex to Free T4 if abnormal    Vitamin B12    CBC and Auto Differential    Comprehensive Metabolic Panel    Hemoglobin A1C    Lipid Panel    Prediabetes    Relevant Orders    TSH with reflex to Free T4 if abnormal    Vitamin B12    CBC and Auto Differential    Comprehensive Metabolic Panel    Hemoglobin A1C    Lipid Panel     Other Visit Diagnoses         Medication monitoring encounter        Relevant Orders    Opiate/Opioid/Benzo Prescription Compliance    TSH  with reflex to Free T4 if abnormal    Vitamin B12    CBC and Auto Differential    Comprehensive Metabolic Panel    Hemoglobin A1C    Lipid Panel            Assessment & Plan  Migraine  Effectively managed with Imitrex.  - Continue Imitrex as needed for migraines.    Chronic Pain  Managed with tramadol without issues.  - Continue tramadol as needed for pain management.    Hypertension  Well-controlled with carvedilol.  - Continue carvedilol.    Hyperlipidemia  On rosuvastatin, cholesterol levels need re-evaluation.  - Order lipid panel before next visit.      Level 4    Follow-Up Recommendations: q3mo    Please excuse any typos or grammatical errors, part of this note was constructed with Dragon dictation software.    This medical note was created with the assistance of artificial intelligence (AI) for documentation purposes. The content has been reviewed and confirmed by the healthcare provider for accuracy and completeness. Patient consented to the use of audio recording and use of AI during their visit.         Steffanie Santos DO, [place]  [facility]   Office: [phone]  6/16/2025 1:15 PM

## 2025-06-16 NOTE — PATIENT INSTRUCTIONS
VISIT SUMMARY:  Jenn Mistry, you had a follow-up visit today to review your current health status and medications. You are currently taking rosuvastatin for cholesterol, carvedilol for high blood pressure, tramadol for chronic pain, and Imitrex for migraines. You reported no issues with your medications and no recent headaches. You are also busy with a home renovation project, which you are managing well with your 's help.    YOUR PLAN:  -MIGRAINE: Your migraines are effectively managed with Imitrex. Continue taking Imitrex as needed when you experience a migraine.    -CHRONIC PAIN: Your chronic pain is managed with tramadol. Continue taking tramadol as needed for pain relief.    -HYPERTENSION: Your high blood pressure is well-controlled with carvedilol. Continue taking carvedilol as prescribed.    -HYPERLIPIDEMIA: Hyperlipidemia means you have high cholesterol levels. You are currently taking rosuvastatin to manage this. We need to re-evaluate your cholesterol levels, so please get a lipid panel test done before your next visit.    INSTRUCTIONS:  Please get a lipid panel test done before your next visit to re-evaluate your cholesterol levels.

## 2025-07-27 DIAGNOSIS — G43.809 OTHER MIGRAINE WITHOUT STATUS MIGRAINOSUS, NOT INTRACTABLE: ICD-10-CM

## 2025-07-29 RX ORDER — SUMATRIPTAN SUCCINATE 100 MG/1
TABLET ORAL
Qty: 9 TABLET | Refills: 0 | Status: SHIPPED | OUTPATIENT
Start: 2025-07-29

## 2025-10-16 ENCOUNTER — APPOINTMENT (OUTPATIENT)
Dept: PRIMARY CARE | Facility: CLINIC | Age: 70
End: 2025-10-16
Payer: MEDICARE

## 2025-12-29 ENCOUNTER — APPOINTMENT (OUTPATIENT)
Dept: PRIMARY CARE | Facility: CLINIC | Age: 70
End: 2025-12-29
Payer: MEDICARE